# Patient Record
Sex: FEMALE | Race: WHITE | Employment: OTHER | URBAN - METROPOLITAN AREA
[De-identification: names, ages, dates, MRNs, and addresses within clinical notes are randomized per-mention and may not be internally consistent; named-entity substitution may affect disease eponyms.]

---

## 2023-07-23 ENCOUNTER — HOSPITAL ENCOUNTER (EMERGENCY)
Age: 68
Discharge: HOME | End: 2023-07-24
Attending: EMERGENCY MEDICINE
Payer: MEDICARE

## 2023-07-23 DIAGNOSIS — R03.0 ELEVATED BLOOD PRESSURE READING: Primary | ICD-10-CM

## 2023-07-23 LAB
BACTERIA URNS QL MICRO: NEGATIVE /HPF
BILIRUB UR QL STRIP: NEGATIVE
CLARITY UR: CLEAR
COLOR UR: YELLOW
EPI CELLS #/AREA URNS AUTO: ABNORMAL /HPF (ref 0–5)
GLUCOSE UR STRIP-MCNC: NEGATIVE MG/DL
HGB UR QL STRIP: ABNORMAL
HYALINE CASTS #/AREA URNS AUTO: ABNORMAL /HPF (ref 0–5)
KETONES UR STRIP-MCNC: NEGATIVE MG/DL
LEUKOCYTE ESTERASE UR QL STRIP: NEGATIVE
NITRITE UR QL STRIP: NEGATIVE
PH UR STRIP: 6.5 [PH] (ref 5–9)
PROT UR STRIP-MCNC: NEGATIVE MG/DL
RBC #/AREA URNS AUTO: ABNORMAL /HPF (ref 0–5)
SP GR UR STRIP: 1.01 (ref 1–1.03)
UROBILINOGEN UR STRIP-ACNC: 0.2 E.U./DL
WBC #/AREA URNS AUTO: ABNORMAL /HPF (ref 0–5)

## 2023-07-23 PROCEDURE — 84484 ASSAY OF TROPONIN QUANT: CPT

## 2023-07-23 PROCEDURE — 85025 COMPLETE CBC W/AUTO DIFF WBC: CPT

## 2023-07-23 PROCEDURE — 81001 URINALYSIS AUTO W/SCOPE: CPT

## 2023-07-23 PROCEDURE — 99284 EMERGENCY DEPT VISIT MOD MDM: CPT

## 2023-07-23 PROCEDURE — 36415 COLL VENOUS BLD VENIPUNCTURE: CPT

## 2023-07-23 PROCEDURE — 80053 COMPREHEN METABOLIC PANEL: CPT

## 2023-07-23 ASSESSMENT — PAIN - FUNCTIONAL ASSESSMENT: PAIN_FUNCTIONAL_ASSESSMENT: NONE - DENIES PAIN

## 2023-07-24 VITALS
WEIGHT: 140 LBS | OXYGEN SATURATION: 97 % | RESPIRATION RATE: 11 BRPM | SYSTOLIC BLOOD PRESSURE: 150 MMHG | BODY MASS INDEX: 23.9 KG/M2 | HEIGHT: 64 IN | HEART RATE: 65 BPM | DIASTOLIC BLOOD PRESSURE: 79 MMHG | TEMPERATURE: 97.9 F

## 2023-07-24 LAB
ALBUMIN SERPL-MCNC: 4.9 G/DL (ref 3.5–4.6)
ALP SERPL-CCNC: 71 U/L (ref 40–130)
ALT SERPL-CCNC: 19 U/L (ref 0–33)
ANION GAP SERPL CALCULATED.3IONS-SCNC: 10 MEQ/L (ref 9–15)
ANISOCYTOSIS BLD QL SMEAR: ABNORMAL
AST SERPL-CCNC: 18 U/L (ref 0–35)
BASOPHILS # BLD: 0.1 K/UL (ref 0–0.2)
BASOPHILS NFR BLD: 1 %
BILIRUB SERPL-MCNC: <0.2 MG/DL (ref 0.2–0.7)
BUN SERPL-MCNC: 23 MG/DL (ref 8–23)
CALCIUM SERPL-MCNC: 9.8 MG/DL (ref 8.5–9.9)
CHLORIDE SERPL-SCNC: 102 MEQ/L (ref 95–107)
CO2 SERPL-SCNC: 28 MEQ/L (ref 20–31)
CREAT SERPL-MCNC: 0.78 MG/DL (ref 0.5–0.9)
DACRYOCYTES BLD QL SMEAR: ABNORMAL
EOSINOPHIL # BLD: 0.1 K/UL (ref 0–0.7)
EOSINOPHIL NFR BLD: 1.4 %
ERYTHROCYTE [DISTWIDTH] IN BLOOD BY AUTOMATED COUNT: 16.7 % (ref 11.5–14.5)
GLOBULIN SER CALC-MCNC: 3 G/DL (ref 2.3–3.5)
GLUCOSE SERPL-MCNC: 117 MG/DL (ref 70–99)
HCT VFR BLD AUTO: 40 % (ref 37–47)
HGB BLD-MCNC: 12.6 G/DL (ref 12–16)
LYMPHOCYTES # BLD: 2.4 K/UL (ref 1–4.8)
LYMPHOCYTES NFR BLD: 28.1 %
MCH RBC QN AUTO: 19.8 PG (ref 27–31.3)
MCHC RBC AUTO-ENTMCNC: 31.4 % (ref 33–37)
MCV RBC AUTO: 63 FL (ref 79.4–94.8)
MICROCYTES BLD QL SMEAR: ABNORMAL
MONOCYTES # BLD: 0.6 K/UL (ref 0.2–0.8)
MONOCYTES NFR BLD: 7.4 %
NEUTROPHILS # BLD: 5.4 K/UL (ref 1.4–6.5)
NEUTS SEG NFR BLD: 62.1 %
OVALOCYTES BLD QL SMEAR: ABNORMAL
PLATELET # BLD AUTO: 369 K/UL (ref 130–400)
POLYCHROMASIA BLD QL SMEAR: ABNORMAL
POTASSIUM SERPL-SCNC: 4.1 MEQ/L (ref 3.4–4.9)
PROT SERPL-MCNC: 7.9 G/DL (ref 6.3–8)
RBC # BLD AUTO: 6.35 M/UL (ref 4.2–5.4)
SLIDE REVIEW: ABNORMAL
SODIUM SERPL-SCNC: 140 MEQ/L (ref 135–144)
TARGETS BLD QL SMEAR: ABNORMAL
TROPONIN T SERPL-MCNC: <0.01 NG/ML (ref 0–0.01)
WBC # BLD AUTO: 8.7 K/UL (ref 4.8–10.8)

## 2023-07-24 PROCEDURE — 6370000000 HC RX 637 (ALT 250 FOR IP): Performed by: EMERGENCY MEDICINE

## 2023-07-24 RX ORDER — AMLODIPINE BESYLATE 5 MG/1
5 TABLET ORAL ONCE
Status: COMPLETED | OUTPATIENT
Start: 2023-07-24 | End: 2023-07-24

## 2023-07-24 RX ORDER — AMLODIPINE BESYLATE 5 MG/1
5 TABLET ORAL DAILY
Qty: 30 TABLET | Refills: 0 | Status: SHIPPED | OUTPATIENT
Start: 2023-07-24 | End: 2023-08-23

## 2023-07-24 RX ADMIN — AMLODIPINE BESYLATE 5 MG: 5 TABLET ORAL at 00:27

## 2023-07-24 NOTE — ED PROVIDER NOTES
[facility] ED  EMERGENCY DEPARTMENT ENCOUNTER      Pt Name: Carlos Mesa  MRN: [id number]  [address] [facility] [place] 1955  Date of evaluation: 7/23/2023  Provider: Bozena Richard DO    CHIEF COMPLAINT       Chief Complaint   Patient presents with    Hypertension     X2 weeks. States she does not have a history of hypertension          HISTORY OF PRESENT ILLNESS   (Location/Symptom, Timing/Onset, Context/Setting, Quality, Duration, Modifying Factors, Severity)  Note limiting factors. Carlos Mesa is a 77 y.o. female who presents to the emergency department for elevated bp.     77year old female presents for evaluation of elevated blood pressure. Has been elevated the past few weeks, and she has been keeping a log. Felt funny tonight and her blood pressure was even higher. Denies any focal weakness, or chest pain. No hx htn. The history is provided by the patient. Nursing Notes were reviewed. REVIEW OF SYSTEMS    (2-9 systems for level 4, 10 or more for level 5)     Review of Systems   All other systems reviewed and are negative. Except as noted above the remainder of the review of systems was reviewed and negative. PAST MEDICAL HISTORY   History reviewed. No pertinent past medical history. SURGICAL HISTORY     History reviewed. No pertinent surgical history. CURRENT MEDICATIONS       Previous Medications    No medications on file       ALLERGIES     Patient has no allergy information on record. FAMILY HISTORY     History reviewed. No pertinent family history.        SOCIAL HISTORY       Social History     Socioeconomic History    Marital status:      Spouse name: None    Number of children: None    Years of education: None    Highest education level: None   Tobacco Use    Smoking status: Never    Smokeless tobacco: Never   Substance and Sexual Activity    Alcohol use: Not Currently    Drug use: Yes     Types: Marijuana Ara Rival)     Comment: very rare       SCREENINGS

## 2023-07-31 PROBLEM — I44.7 LEFT BUNDLE BRANCH BLOCK (LBBB): Status: ACTIVE | Noted: 2023-07-31

## 2023-07-31 PROBLEM — N95.1 MENOPAUSAL SYNDROME: Status: ACTIVE | Noted: 2023-07-31

## 2023-07-31 PROBLEM — H40.9 GLAUCOMA: Status: ACTIVE | Noted: 2023-07-31

## 2023-07-31 PROBLEM — R07.89 ATYPICAL CHEST PAIN: Status: ACTIVE | Noted: 2023-07-31

## 2023-07-31 PROBLEM — M51.369 DEGENERATION OF INTERVERTEBRAL DISC OF LUMBAR REGION: Status: ACTIVE | Noted: 2022-12-13

## 2023-07-31 PROBLEM — E78.5 DYSLIPIDEMIA: Status: ACTIVE | Noted: 2023-07-31

## 2023-07-31 PROBLEM — F41.8 ANXIETY ABOUT HEALTH: Status: ACTIVE | Noted: 2023-07-31

## 2023-07-31 PROBLEM — M51.36 DEGENERATION OF INTERVERTEBRAL DISC OF LUMBAR REGION: Status: ACTIVE | Noted: 2022-12-13

## 2023-07-31 PROBLEM — M54.9 BACK PAIN: Status: ACTIVE | Noted: 2023-02-07

## 2023-07-31 PROBLEM — M79.7 FIBROMYOSITIS: Status: ACTIVE | Noted: 2023-07-31

## 2023-07-31 PROBLEM — R25.2 CRAMPS OF LOWER EXTREMITY: Status: ACTIVE | Noted: 2023-07-31

## 2023-07-31 PROBLEM — E03.9 HYPOTHYROIDISM: Status: ACTIVE | Noted: 2023-07-31

## 2023-07-31 PROBLEM — D56.9 THALASSEMIA: Status: ACTIVE | Noted: 2023-07-31

## 2023-08-29 ENCOUNTER — OFFICE VISIT (OUTPATIENT)
Dept: PRIMARY CARE | Facility: CLINIC | Age: 68
End: 2023-08-29
Payer: MEDICARE

## 2023-08-29 ENCOUNTER — LAB (OUTPATIENT)
Dept: LAB | Facility: LAB | Age: 68
End: 2023-08-29
Payer: MEDICARE

## 2023-08-29 VITALS
SYSTOLIC BLOOD PRESSURE: 148 MMHG | OXYGEN SATURATION: 98 % | TEMPERATURE: 97.6 F | DIASTOLIC BLOOD PRESSURE: 84 MMHG | HEIGHT: 63 IN | WEIGHT: 142.2 LBS | RESPIRATION RATE: 16 BRPM | BODY MASS INDEX: 25.2 KG/M2 | HEART RATE: 59 BPM

## 2023-08-29 DIAGNOSIS — Z00.00 HEALTH CARE MAINTENANCE: ICD-10-CM

## 2023-08-29 DIAGNOSIS — R25.2 CRAMPS OF LOWER EXTREMITY: ICD-10-CM

## 2023-08-29 DIAGNOSIS — D64.9 ANEMIA, UNSPECIFIED TYPE: Primary | ICD-10-CM

## 2023-08-29 DIAGNOSIS — I10 HYPERTENSION, UNSPECIFIED TYPE: Primary | ICD-10-CM

## 2023-08-29 DIAGNOSIS — E61.1 IRON DEFICIENCY: ICD-10-CM

## 2023-08-29 PROBLEM — M79.7 FIBROMYALGIA: Status: ACTIVE | Noted: 2023-08-29

## 2023-08-29 PROBLEM — M54.42 ACUTE LEFT-SIDED LOW BACK PAIN WITH LEFT-SIDED SCIATICA: Status: ACTIVE | Noted: 2023-08-29

## 2023-08-29 PROBLEM — E04.1 CYST OF THYROID: Status: ACTIVE | Noted: 2023-08-29

## 2023-08-29 PROBLEM — G47.62 NOCTURNAL LEG CRAMPS: Status: ACTIVE | Noted: 2023-08-29

## 2023-08-29 PROBLEM — R07.9 CHEST PAIN: Status: ACTIVE | Noted: 2023-08-29

## 2023-08-29 PROBLEM — I44.7 LBBB (LEFT BUNDLE BRANCH BLOCK): Status: ACTIVE | Noted: 2023-08-29

## 2023-08-29 PROBLEM — N95.1 FEMALE CLIMACTERIC: Status: ACTIVE | Noted: 2023-08-29

## 2023-08-29 PROBLEM — E55.9 VITAMIN D DEFICIENCY: Status: ACTIVE | Noted: 2023-08-29

## 2023-08-29 PROBLEM — R00.2 PALPITATIONS: Status: ACTIVE | Noted: 2023-08-29

## 2023-08-29 LAB
ERYTHROCYTE DISTRIBUTION WIDTH (RATIO) BY AUTOMATED COUNT: 17.9 % (ref 11.5–14.5)
ERYTHROCYTE MEAN CORPUSCULAR HEMOGLOBIN CONCENTRATION (G/DL) BY AUTOMATED: 30.1 G/DL (ref 32–36)
ERYTHROCYTE MEAN CORPUSCULAR VOLUME (FL) BY AUTOMATED COUNT: 65 FL (ref 80–100)
ERYTHROCYTES (10*6/UL) IN BLOOD BY AUTOMATED COUNT: 5.68 X10E12/L (ref 4–5.2)
HEMATOCRIT (%) IN BLOOD BY AUTOMATED COUNT: 37.2 % (ref 36–46)
HEMOGLOBIN (G/DL) IN BLOOD: 11.2 G/DL (ref 12–16)
LEUKOCYTES (10*3/UL) IN BLOOD BY AUTOMATED COUNT: 5.9 X10E9/L (ref 4.4–11.3)
PLATELETS (10*3/UL) IN BLOOD AUTOMATED COUNT: 343 X10E9/L (ref 150–450)

## 2023-08-29 PROCEDURE — 3077F SYST BP >= 140 MM HG: CPT | Performed by: INTERNAL MEDICINE

## 2023-08-29 PROCEDURE — 85027 COMPLETE CBC AUTOMATED: CPT

## 2023-08-29 PROCEDURE — 1159F MED LIST DOCD IN RCRD: CPT | Performed by: INTERNAL MEDICINE

## 2023-08-29 PROCEDURE — 90471 IMMUNIZATION ADMIN: CPT | Performed by: INTERNAL MEDICINE

## 2023-08-29 PROCEDURE — 90715 TDAP VACCINE 7 YRS/> IM: CPT | Performed by: INTERNAL MEDICINE

## 2023-08-29 PROCEDURE — 36415 COLL VENOUS BLD VENIPUNCTURE: CPT

## 2023-08-29 PROCEDURE — 1036F TOBACCO NON-USER: CPT | Performed by: INTERNAL MEDICINE

## 2023-08-29 PROCEDURE — 82728 ASSAY OF FERRITIN: CPT

## 2023-08-29 PROCEDURE — 3079F DIAST BP 80-89 MM HG: CPT | Performed by: INTERNAL MEDICINE

## 2023-08-29 PROCEDURE — 99204 OFFICE O/P NEW MOD 45 MIN: CPT | Performed by: INTERNAL MEDICINE

## 2023-08-29 RX ORDER — ROSUVASTATIN CALCIUM 10 MG/1
10 TABLET, COATED ORAL DAILY
COMMUNITY
End: 2023-12-08 | Stop reason: SDUPTHER

## 2023-08-29 RX ORDER — AMLODIPINE BESYLATE 5 MG/1
5 TABLET ORAL DAILY
Qty: 90 TABLET | Refills: 3 | Status: SHIPPED | OUTPATIENT
Start: 2023-08-29 | End: 2024-04-10

## 2023-08-29 RX ORDER — TIMOLOL MALEATE 5 MG/ML
SOLUTION/ DROPS OPHTHALMIC
COMMUNITY
Start: 2023-05-16

## 2023-08-29 RX ORDER — AMLODIPINE BESYLATE 5 MG/1
5 TABLET ORAL DAILY
COMMUNITY
End: 2023-08-29 | Stop reason: SDUPTHER

## 2023-08-29 RX ORDER — LEVOTHYROXINE SODIUM 50 UG/1
50 TABLET ORAL
COMMUNITY
Start: 2019-09-06 | End: 2023-12-18 | Stop reason: SDUPTHER

## 2023-08-29 ASSESSMENT — PATIENT HEALTH QUESTIONNAIRE - PHQ9
1. LITTLE INTEREST OR PLEASURE IN DOING THINGS: NOT AT ALL
SUM OF ALL RESPONSES TO PHQ9 QUESTIONS 1 AND 2: 0
2. FEELING DOWN, DEPRESSED OR HOPELESS: NOT AT ALL

## 2023-08-29 NOTE — PROGRESS NOTES
"Subjective   Patient ID: [id number]     Jordana Haney is a 68 y.o. female who presents for New Patient Visit.    Current Outpatient Medications:     levothyroxine (Synthroid, Levoxyl) 50 mcg tablet, Take 1 tablet (50 mcg) by mouth once daily in the morning. Take before meals., Disp: , Rfl:     multivit-min/ferrous fumarate (MULTI VITAMIN ORAL), On occasion, Disp: , Rfl:     rosuvastatin (Crestor) 10 mg tablet, Take 1 tablet (10 mg) by mouth once daily., Disp: , Rfl:     timolol (Timoptic) 0.5 % ophthalmic solution, INSTILL ONE DROP INTO BOTH EYES IN THE MORNING, Disp: , Rfl:     amLODIPine (Norvasc) 5 mg tablet, Take 1 tablet (5 mg) by mouth once daily., Disp: 90 tablet, Rfl: 3  HPI  Jordana is a 68-year-old female patient presenting to the office today to establish care.    Patient is known to have history of hypertension, she was recently started on amlodipine 5 mg p.o. daily.  She did bring his home measurements today the last week has been all below 140/90 and acceptable.  She denies any chest pain or difficulty breathing.  She does not have occasional mild low back pain with right worse than left without radiculopathy.  She is known to have chronic low back pain issues.    She did mention lower extremity cramping.  She just had blood work done in July that indicated normal electrolytes.  There was evidence of microcytic hypochromic changes suggestive of iron deficiency.  We are going to follow-up on that today.  Review of system was reviewed all normal except what is noted in HPI   Past Medical History:   Diagnosis Date    Elevated cholesterol     Glaucoma     Hypertension     Hypothyroidism       Objective   /84   Pulse 59   Temp 36.4 °C (97.6 °F)   Resp 16   Ht 1.588 m (5' 2.5\")   Wt 64.5 kg (142 lb 3.2 oz)   SpO2 98%   BMI 25.59 kg/m²      Physical Exam  Constitutional:       Appearance: Normal appearance.   Cardiovascular:      Rate and Rhythm: Normal rate and regular rhythm.      Pulses: Normal " pulses.      Heart sounds: Normal heart sounds.   Pulmonary:      Effort: Pulmonary effort is normal.      Breath sounds: Normal breath sounds.   Neurological:      Mental Status: She is alert.       Assessment/Plan   Problem List Items Addressed This Visit    None  Visit Diagnoses       Hypertension, unspecified type    -  Primary    Relevant Medications    amLODIPine (Norvasc) 5 mg tablet    Health care maintenance        Relevant Orders    Tdap vaccine, age 10 years and older  (BOOSTRIX)          68-year-old patient with the following issues.    1.  Hypertension, her blood pressure today was elevated in the office yet her home readings are very well controlled.  We are going to continue monitoring and we will do dose adjustment if necessary  She was instructed to bring her blood pressure machine with her to compare and confirm accuracy.    2.  Low back pain right worse than left likely musculoskeletal in nature without radiculopathy patient was instructed to apply heating pad to the area and to use Voltaren gel on as-needed basis and if it gets worse we will definitely proceed with further evaluation.  She did not want any x-rays done today.    3.  Leg cramps likely related to iron deficiency her CBC indicates hypochromic microcytic changes repeat CBC with ferritin level will be obtained and patient will be started on a supplement if the patient is truly iron deficient we are going to refer her to GI for further evaluation with upper and lower endoscopy.    No other active issues or concerns during this visit I will see the patient back in the office for her annual exam within the next 3 months.  Patricia Crawley MD

## 2023-08-30 LAB — FERRITIN (UG/LL) IN SER/PLAS: 280 UG/L (ref 8–150)

## 2023-09-06 ENCOUNTER — TELEPHONE (OUTPATIENT)
Dept: PRIMARY CARE | Facility: CLINIC | Age: 68
End: 2023-09-06
Payer: MEDICARE

## 2023-11-09 ENCOUNTER — TELEPHONE (OUTPATIENT)
Dept: PRIMARY CARE | Facility: CLINIC | Age: 68
End: 2023-11-09

## 2023-11-09 NOTE — TELEPHONE ENCOUNTER
Contacted pt to see if she would like to set up her annual exam sooner into this year and she declined since she wanted to keep it close to when she is due for her mammogram in February. She has her MCW set currently for 2/27/24. Also stated she has had her colonoscopy done back in 2019 and is not due for it until 2024. She believes it was done with [facility] with Dr Rapp. Pt is aware a release of records will need signed if this is correct. Please advise if we are able to verify if colonoscopy was done with them. Phone # is [phone]. Thank you!

## 2023-12-08 DIAGNOSIS — E78.5 HYPERLIPIDEMIA, UNSPECIFIED HYPERLIPIDEMIA TYPE: Primary | ICD-10-CM

## 2023-12-08 RX ORDER — ROSUVASTATIN CALCIUM 10 MG/1
10 TABLET, COATED ORAL DAILY
Qty: 90 TABLET | Refills: 3 | Status: SHIPPED | OUTPATIENT
Start: 2023-12-08

## 2023-12-18 DIAGNOSIS — E03.9 ACQUIRED HYPOTHYROIDISM: ICD-10-CM

## 2023-12-18 RX ORDER — LEVOTHYROXINE SODIUM 50 UG/1
50 TABLET ORAL
Qty: 90 TABLET | Refills: 3 | Status: SHIPPED | OUTPATIENT
Start: 2023-12-18

## 2023-12-18 NOTE — TELEPHONE ENCOUNTER
----- Message from Jordana Haney sent at 12/16/2023 10:54 AM EST -----  Regarding: Refill  Contact: [phone]  Please send a 90 day refill request to [facility] for Levothyroxine with 4 or 5 refills.  Thank you.

## 2024-02-27 ENCOUNTER — OFFICE VISIT (OUTPATIENT)
Dept: PRIMARY CARE | Facility: CLINIC | Age: 69
End: 2024-02-27
Payer: MEDICARE

## 2024-02-27 ENCOUNTER — HOSPITAL ENCOUNTER (OUTPATIENT)
Dept: RADIOLOGY | Facility: CLINIC | Age: 69
Discharge: HOME | End: 2024-02-27
Payer: MEDICARE

## 2024-02-27 VITALS
SYSTOLIC BLOOD PRESSURE: 144 MMHG | HEIGHT: 63 IN | OXYGEN SATURATION: 99 % | HEART RATE: 81 BPM | DIASTOLIC BLOOD PRESSURE: 78 MMHG | WEIGHT: 145.8 LBS | BODY MASS INDEX: 25.83 KG/M2

## 2024-02-27 DIAGNOSIS — G47.00 INSOMNIA, UNSPECIFIED TYPE: ICD-10-CM

## 2024-02-27 DIAGNOSIS — K21.9 GASTROESOPHAGEAL REFLUX DISEASE WITHOUT ESOPHAGITIS: ICD-10-CM

## 2024-02-27 DIAGNOSIS — Z12.31 ENCOUNTER FOR SCREENING MAMMOGRAM FOR MALIGNANT NEOPLASM OF BREAST: ICD-10-CM

## 2024-02-27 DIAGNOSIS — M79.671 RIGHT FOOT PAIN: ICD-10-CM

## 2024-02-27 DIAGNOSIS — Z00.00 HEALTH CARE MAINTENANCE: Primary | ICD-10-CM

## 2024-02-27 DIAGNOSIS — M79.661 RIGHT CALF PAIN: ICD-10-CM

## 2024-02-27 DIAGNOSIS — Z12.11 SCREENING FOR COLON CANCER: ICD-10-CM

## 2024-02-27 PROCEDURE — 1159F MED LIST DOCD IN RCRD: CPT | Performed by: INTERNAL MEDICINE

## 2024-02-27 PROCEDURE — G0439 PPPS, SUBSEQ VISIT: HCPCS | Performed by: INTERNAL MEDICINE

## 2024-02-27 PROCEDURE — 1170F FXNL STATUS ASSESSED: CPT | Performed by: INTERNAL MEDICINE

## 2024-02-27 PROCEDURE — 93000 ELECTROCARDIOGRAM COMPLETE: CPT | Performed by: INTERNAL MEDICINE

## 2024-02-27 PROCEDURE — 73620 X-RAY EXAM OF FOOT: CPT | Mod: RT

## 2024-02-27 PROCEDURE — 73620 X-RAY EXAM OF FOOT: CPT | Mod: RIGHT SIDE | Performed by: RADIOLOGY

## 2024-02-27 PROCEDURE — 99497 ADVNCD CARE PLAN 30 MIN: CPT | Performed by: INTERNAL MEDICINE

## 2024-02-27 PROCEDURE — 1158F ADVNC CARE PLAN TLK DOCD: CPT | Performed by: INTERNAL MEDICINE

## 2024-02-27 PROCEDURE — 1036F TOBACCO NON-USER: CPT | Performed by: INTERNAL MEDICINE

## 2024-02-27 PROCEDURE — G0444 DEPRESSION SCREEN ANNUAL: HCPCS | Performed by: INTERNAL MEDICINE

## 2024-02-27 PROCEDURE — 1123F ACP DISCUSS/DSCN MKR DOCD: CPT | Performed by: INTERNAL MEDICINE

## 2024-02-27 PROCEDURE — 99397 PER PM REEVAL EST PAT 65+ YR: CPT | Performed by: INTERNAL MEDICINE

## 2024-02-27 RX ORDER — OMEPRAZOLE 20 MG/1
20 TABLET, DELAYED RELEASE ORAL DAILY
Qty: 30 TABLET | Refills: 11 | COMMUNITY
Start: 2024-02-27 | End: 2024-05-02 | Stop reason: ALTCHOICE

## 2024-02-27 RX ORDER — TRAZODONE HYDROCHLORIDE 50 MG/1
50 TABLET ORAL NIGHTLY
Qty: 30 TABLET | Refills: 3 | Status: SHIPPED | OUTPATIENT
Start: 2024-02-27 | End: 2025-02-26

## 2024-02-27 ASSESSMENT — ACTIVITIES OF DAILY LIVING (ADL)
DOING_HOUSEWORK: INDEPENDENT
MANAGING_FINANCES: INDEPENDENT
TAKING_MEDICATION: INDEPENDENT
GROCERY_SHOPPING: INDEPENDENT
BATHING: INDEPENDENT
DRESSING: INDEPENDENT

## 2024-02-27 ASSESSMENT — PATIENT HEALTH QUESTIONNAIRE - PHQ9
SUM OF ALL RESPONSES TO PHQ9 QUESTIONS 1 AND 2: 0
2. FEELING DOWN, DEPRESSED OR HOPELESS: NOT AT ALL
1. LITTLE INTEREST OR PLEASURE IN DOING THINGS: NOT AT ALL

## 2024-02-27 NOTE — PROGRESS NOTES
Medicare Wellness Exam/Comprehensive Problem Focused Follow Up and Physical Exam  Chief Complaint   Patient presents with    Medicare Annual Wellness Visit Subsequent    Annual Exam     HPI:  68-year-old patient presented to the office today for her Medicare wellness visit as well as her annual examination.    Patient is doing great she denies any chest pain or difficulty breathing on exertion.  She occasionally when she experiences heartburn she feels some twinges in her chest but no exertional symptoms.  She denies any abdominal pain nausea vomiting changes in the bowel movements or blood in the stool she denies urine symptoms.  Her appetite is good her energy level is adequate.    She does have trouble sleeping and she was in agreement to try trazodone.    She also did notice some swelling in her lower extremities and pain in her right foot mostly on the lateral outer aspect of the right foot no recent trauma no recent fall no calf pain no previous history of blood clots no family history of thromboembolic disease.  Assessment and Plan:    68-year-old patient with the following issues.    1.Essential benign hypertension, the patient's blood pressure today remains elevated and I believe her home readings are still elevated we are going to increase her amlodipine to 7.5 mg p.o. daily instead of 5 mg p.o. daily and I will see her back within the next 7 to 10 days for blood pressure follow-up with her machine to compare and confirm accuracy patient stated understanding.    2.  Hyperlipidemia on rosuvastatin the plan is to continue lipid profile is requested we will adjust the treatment if necessary.    3.  Hypothyroidism on levothyroxine TSH is pending dose adjustment will take place if necessary.    4.  Concerns regarding insomnia we are going to do a trial of trazodone at bedtime and we will assess the clinical response.    5.  Symptoms suggestive of acid reflux, a trial of proton pump inhibitor was indicated the  "prescription was sent to the pharmacy.    6.  Left bundle branch block noted on EKG which was compared to prior studies and it is definitely been noted on a prior EKG in 2018 at that point patient had a stress test which was negative.    7.  Healthcare maintenance issues lab work is requested mammogram colonoscopy and DEXA scan requested.    8.  Medicare wellness study was completed with all its requirements.    Disposition I will see the patient back in the office in 1 year for repeat physical in 3 months for follow-up.    ROS otherwise negative aside from what was mentioned above in HPI.    Vitals  /78 (BP Location: Left arm, Patient Position: Sitting)   Pulse 81   Ht 1.588 m (5' 2.5\")   Wt 66.1 kg (145 lb 12.8 oz)   SpO2 99%   BMI 26.24 kg/m²   Body mass index is 26.24 kg/m².  Physical Exam  Gen: Alert, NAD  HEENT:  Unremarkable  Neck:  No DAMON  Respiratory:  Lungs CTAB  Cardiovascular:  Heart RRR  Neuro:  Gross motor and sensory intact  Skin:  No suspicious lesions present  Breast: No masses, or axillary lymphadenopathy      Patient Care Team:  Patricia Crawley MD as PCP - General (Internal Medicine)  Patricia Crawley MD as PCP - [employer] PCP  Nighat Barbour MD as Referring Physician (Dermatology)       Activities of Daily Living  In your present state of health, do you have any difficulty performing the following activities?:   Preparing food and eating?: No  Bathing yourself: No  Getting dressed: No  Using the toilet:No  Moving around from place to place: No  In the past year have you fallen or had a near fall?:No  Able to manage finances independently: Yes  Able to perform grocery shopping: Yes  Able to manage medications independently: Yes  Able to do housework independently: Yes  Patient self-assessment of health status? Good    Current exercise habits:  work out 4-5 times per wk   Dietary issues discussed: Yes  Hearing difficulties: No  Safe in current home environment: " Yes  Visual Acuity assessed: Yes  Cognitive Impairment No  Allergies and Medications  Povidone-iodine  Current Outpatient Medications   Medication Instructions    amLODIPine (NORVASC) 5 mg, oral, Daily    levothyroxine (SYNTHROID, LEVOXYL) 50 mcg, oral, Daily before breakfast    multivit-min/ferrous fumarate (MULTI VITAMIN ORAL) On occasion    rosuvastatin (CRESTOR) 10 mg, oral, Daily    timolol (Timoptic) 0.5 % ophthalmic solution  INSTILL ONE DROP INTO BOTH EYES IN THE MORNING<BR>       Medications and Supplements  prescribed by me and other practitioners or clinical pharmacist (such as prescriptions, OTC's, herbal therapies and supplements) were reviewed and documented in the medical record.      Active Problem List  Patient Active Problem List   Diagnosis    Anxiety about health    Atypical chest pain    Back pain    Cramps of lower extremity    Degeneration of intervertebral disc of lumbar region    Dyslipidemia    Fibromyositis    Glaucoma    Hypothyroidism    Left bundle branch block (LBBB)    Menopausal syndrome    Thalassemia    Fibromyalgia    Palpitations    Vitamin D deficiency    Acute left-sided low back pain with left-sided sciatica    Chest pain    Nocturnal leg cramps    Cyst of thyroid    LBBB (left bundle branch block)    Female climacteric       Comprehensive Medical/Surgical/Social/Family History  Past Medical History:   Diagnosis Date    Elevated cholesterol     Glaucoma     Hypertension     Hypothyroidism      Past Surgical History:   Procedure Laterality Date    BREAST BIOPSY Right      SECTION, CLASSIC      THYROID SURGERY      nodule removed     Social History     Social History Narrative    Not on file     Tobacco/Alcohol/Opioid use, as well as Illicit Drug Use was screened for/reviewed and documented in Social Documentation section of the chart and medication list as appropriate    Depression Screening  Depression screening (15m) completed using the PHQ-2 questions with  results documented in the chart/encounter  (Rooming Screening section for documentation, or note for additional information)    Cardiac Risk Assessment  Cardiovascular risk was discussed and, if needed, lifestyle modifications recommended, including nutritional choices, exercise, and elimination of habits contributing to risk.   We agreed on a plan to reduce the current cardiovascular risk based on above discussion as needed.     Aspirin use/disuse was discussed and documented in the Problem List of the medical record (under Cardiac Risk Counseling) after reviewing the updated guidelines below:  Consider low dose Aspirin ( mg) use if the benefit for cardiovascular disease prevention outweighs risk for bleeding complications.   In general, low dose ASA should be considered:  In patients WITHOUT prior MI/stroke/PAD (primary prevention):   a. Age <60: Use if 10-year cardiovascular disease risk >20%, with discussion of risks and benefits with patient  b. Age 60-<70: Use if 10-year cardiovascular disease risk >20% and low bleeding (e.g., gastrointestinal) risk, with discussion of risks and benefits with patient  c. Age >=70: Do not use    In patients WITH prior MI/stroke/PAD (secondary prevention):   Generally use unless extremely high bleeding (e.g., gastrointenstinal) risk, with discussion of risks and benefits with patient    Advance Directives Discussion  Advanced Care Planning (including a Living Will, Healthcare POA, as well as specific end of life choices and/or directives), was discussed with the patient and/or surrogate, voluntarily, and details of that discussion documented in the Problem List (under Advanced Directives Discussion) of the medical record.    (~16 min spent discussing above)     During the course of the visit the patient was educated and counseled about age appropriate screening and preventive services. Completed preventive screenings were documented in the chart and orders were placed for  outstanding screenings/procedures as documented in the Assessment and Plan.    Patient Instructions (the written plan) was given to the patient at check out.

## 2024-02-29 ENCOUNTER — LAB (OUTPATIENT)
Dept: LAB | Facility: LAB | Age: 69
End: 2024-02-29
Payer: MEDICARE

## 2024-02-29 DIAGNOSIS — Z00.00 HEALTH CARE MAINTENANCE: ICD-10-CM

## 2024-02-29 DIAGNOSIS — K21.9 GASTROESOPHAGEAL REFLUX DISEASE WITHOUT ESOPHAGITIS: ICD-10-CM

## 2024-02-29 DIAGNOSIS — M79.661 RIGHT CALF PAIN: ICD-10-CM

## 2024-02-29 LAB
ALBUMIN SERPL BCP-MCNC: 4.6 G/DL (ref 3.4–5)
ALP SERPL-CCNC: 62 U/L (ref 33–136)
ALT SERPL W P-5'-P-CCNC: 17 U/L (ref 7–45)
ANION GAP SERPL CALC-SCNC: 12 MMOL/L (ref 10–20)
AST SERPL W P-5'-P-CCNC: 18 U/L (ref 9–39)
BILIRUB SERPL-MCNC: 0.5 MG/DL (ref 0–1.2)
BUN SERPL-MCNC: 16 MG/DL (ref 6–23)
CALCIUM SERPL-MCNC: 9.6 MG/DL (ref 8.6–10.3)
CHLORIDE SERPL-SCNC: 105 MMOL/L (ref 98–107)
CHOLEST SERPL-MCNC: 169 MG/DL (ref 0–199)
CHOLESTEROL/HDL RATIO: 2.5
CO2 SERPL-SCNC: 28 MMOL/L (ref 21–32)
CREAT SERPL-MCNC: 0.79 MG/DL (ref 0.5–1.05)
D DIMER PPP FEU-MCNC: 302 NG/ML FEU
EGFRCR SERPLBLD CKD-EPI 2021: 81 ML/MIN/1.73M*2
ERYTHROCYTE [DISTWIDTH] IN BLOOD BY AUTOMATED COUNT: 18.2 % (ref 11.5–14.5)
GLUCOSE SERPL-MCNC: 88 MG/DL (ref 74–99)
HCT VFR BLD AUTO: 39.6 % (ref 36–46)
HDLC SERPL-MCNC: 68 MG/DL
HGB BLD-MCNC: 12.1 G/DL (ref 12–16)
LDLC SERPL CALC-MCNC: 88 MG/DL
MCH RBC QN AUTO: 19.7 PG (ref 26–34)
MCHC RBC AUTO-ENTMCNC: 30.6 G/DL (ref 32–36)
MCV RBC AUTO: 65 FL (ref 80–100)
NON HDL CHOLESTEROL: 101 MG/DL (ref 0–149)
NRBC BLD-RTO: 0 /100 WBCS (ref 0–0)
PLATELET # BLD AUTO: 343 X10*3/UL (ref 150–450)
POTASSIUM SERPL-SCNC: 4.4 MMOL/L (ref 3.5–5.3)
PROT SERPL-MCNC: 6.8 G/DL (ref 6.4–8.2)
RBC # BLD AUTO: 6.13 X10*6/UL (ref 4–5.2)
SODIUM SERPL-SCNC: 141 MMOL/L (ref 136–145)
TRIGL SERPL-MCNC: 67 MG/DL (ref 0–149)
TSH SERPL-ACNC: 2.67 MIU/L (ref 0.44–3.98)
VLDL: 13 MG/DL (ref 0–40)
WBC # BLD AUTO: 7.2 X10*3/UL (ref 4.4–11.3)

## 2024-02-29 PROCEDURE — 84443 ASSAY THYROID STIM HORMONE: CPT

## 2024-02-29 PROCEDURE — 85027 COMPLETE CBC AUTOMATED: CPT

## 2024-02-29 PROCEDURE — 36415 COLL VENOUS BLD VENIPUNCTURE: CPT

## 2024-02-29 PROCEDURE — 80053 COMPREHEN METABOLIC PANEL: CPT

## 2024-02-29 PROCEDURE — 80061 LIPID PANEL: CPT

## 2024-02-29 PROCEDURE — 85379 FIBRIN DEGRADATION QUANT: CPT

## 2024-03-06 DIAGNOSIS — Z12.11 SCREENING FOR COLON CANCER: Primary | ICD-10-CM

## 2024-03-08 RX ORDER — POLYETHYLENE GLYCOL 3350, SODIUM SULFATE ANHYDROUS, SODIUM BICARBONATE, SODIUM CHLORIDE, POTASSIUM CHLORIDE 236; 22.74; 6.74; 5.86; 2.97 G/4L; G/4L; G/4L; G/4L; G/4L
4000 POWDER, FOR SOLUTION ORAL ONCE
Qty: 4000 ML | Refills: 0 | Status: SHIPPED | OUTPATIENT
Start: 2024-03-08 | End: 2024-03-08

## 2024-03-27 ENCOUNTER — HOSPITAL ENCOUNTER (OUTPATIENT)
Dept: RADIOLOGY | Facility: CLINIC | Age: 69
Discharge: HOME | End: 2024-03-27
Payer: MEDICARE

## 2024-03-27 VITALS — WEIGHT: 143 LBS | BODY MASS INDEX: 25.34 KG/M2 | HEIGHT: 63 IN

## 2024-03-27 DIAGNOSIS — Z12.31 ENCOUNTER FOR SCREENING MAMMOGRAM FOR MALIGNANT NEOPLASM OF BREAST: ICD-10-CM

## 2024-03-27 PROCEDURE — 77063 BREAST TOMOSYNTHESIS BI: CPT | Performed by: RADIOLOGY

## 2024-03-27 PROCEDURE — 77067 SCR MAMMO BI INCL CAD: CPT

## 2024-03-27 PROCEDURE — 77067 SCR MAMMO BI INCL CAD: CPT | Performed by: RADIOLOGY

## 2024-04-10 ENCOUNTER — OFFICE VISIT (OUTPATIENT)
Dept: PRIMARY CARE | Facility: CLINIC | Age: 69
End: 2024-04-10
Payer: MEDICARE

## 2024-04-10 VITALS
SYSTOLIC BLOOD PRESSURE: 150 MMHG | OXYGEN SATURATION: 96 % | DIASTOLIC BLOOD PRESSURE: 90 MMHG | HEIGHT: 63 IN | WEIGHT: 146.8 LBS | BODY MASS INDEX: 26.01 KG/M2 | HEART RATE: 58 BPM

## 2024-04-10 DIAGNOSIS — I10 HYPERTENSION, UNSPECIFIED TYPE: ICD-10-CM

## 2024-04-10 DIAGNOSIS — I44.7 LEFT BUNDLE BRANCH BLOCK (LBBB): Primary | Chronic | ICD-10-CM

## 2024-04-10 DIAGNOSIS — R00.2 PALPITATIONS: ICD-10-CM

## 2024-04-10 PROCEDURE — 99214 OFFICE O/P EST MOD 30 MIN: CPT | Performed by: NURSE PRACTITIONER

## 2024-04-10 PROCEDURE — 1123F ACP DISCUSS/DSCN MKR DOCD: CPT | Performed by: NURSE PRACTITIONER

## 2024-04-10 PROCEDURE — 3080F DIAST BP >= 90 MM HG: CPT | Performed by: NURSE PRACTITIONER

## 2024-04-10 PROCEDURE — 93000 ELECTROCARDIOGRAM COMPLETE: CPT | Performed by: NURSE PRACTITIONER

## 2024-04-10 PROCEDURE — 3077F SYST BP >= 140 MM HG: CPT | Performed by: NURSE PRACTITIONER

## 2024-04-10 PROCEDURE — 1159F MED LIST DOCD IN RCRD: CPT | Performed by: NURSE PRACTITIONER

## 2024-04-10 RX ORDER — LISINOPRIL 10 MG/1
10 TABLET ORAL DAILY
Qty: 90 TABLET | Refills: 0 | Status: SHIPPED | OUTPATIENT
Start: 2024-04-10 | End: 2024-07-09

## 2024-04-10 RX ORDER — AMLODIPINE BESYLATE 5 MG/1
5 TABLET ORAL DAILY
Qty: 90 TABLET | Refills: 0 | Status: SHIPPED | OUTPATIENT
Start: 2024-04-10

## 2024-04-10 ASSESSMENT — ENCOUNTER SYMPTOMS: HYPERTENSION: 1

## 2024-04-10 NOTE — PROGRESS NOTES
"Subjective   Patient ID: Jordana Haney \"Alexsander" is a 68 y.o. female who presents for Hypertension (Patient was increased to 1 1/2 tablets of amlodipine and has been taking it for 3 weeks. Patient has been experiencing dizziness, swollen feet and chest tightness. //Patient has been checking her blood pressure at home. Patient has been running in normal range. 121/69, 118/68 and 114/75. ).    Since starting higher dose of amlodipine she feels cheast heaviness and tightness and legs are swollen. No exertional SOB or chest pain and she exercises 4-5 times per week on the elliptical. She does have some dizziness. No leg swelling today. It is intermittent. Went up to higher dose for elevated BP readings at home on most days. Over the past 3 weeks Bps have been 110s-120s SBP.    Hypertension       Review of Systems  otherwise negative aside from what was mentioned above in HPI.    Objective   /90 (BP Location: Left arm, Patient Position: Sitting)   Pulse 58   Ht 1.6 m (5' 3\")   Wt 66.6 kg (146 lb 12.8 oz)   SpO2 96%   BMI 26.00 kg/m²     Physical Exam  Constitutional:       Appearance: Normal appearance.   Cardiovascular:      Rate and Rhythm: Normal rate and regular rhythm.   Pulmonary:      Effort: Pulmonary effort is normal.      Breath sounds: Normal breath sounds.   Abdominal:      General: Abdomen is flat.   Neurological:      General: No focal deficit present.      Mental Status: She is alert and oriented to person, place, and time. Mental status is at baseline.   Psychiatric:         Mood and Affect: Mood normal.         Behavior: Behavior normal.         Thought Content: Thought content normal.         Judgment: Judgment normal.         Assessment/Plan   Problem List Items Addressed This Visit             ICD-10-CM    Left bundle branch block (LBBB) - Primary (Chronic) I44.7     Unchanged EKG         Palpitations R00.2    Relevant Orders    ECG 12 lead (Clinic Performed)    Hypertension (Chronic) I10     " Cut back amlodipine to 5 mg.  Check BP daily on just this. If consistently >140/90 on most days add in the lisinopril 10 mg tab  EKG today unchanged         Relevant Medications    amLODIPine (Norvasc) 5 mg tablet    lisinopril 10 mg tablet

## 2024-04-10 NOTE — ASSESSMENT & PLAN NOTE
Cut back amlodipine to 5 mg.  Check BP daily on just this. If consistently >140/90 on most days add in the lisinopril 10 mg tab  EKG today unchanged

## 2024-04-17 ENCOUNTER — ANESTHESIA EVENT (OUTPATIENT)
Dept: GASTROENTEROLOGY | Facility: HOSPITAL | Age: 69
End: 2024-04-17
Payer: MEDICARE

## 2024-04-17 ENCOUNTER — ANESTHESIA (OUTPATIENT)
Dept: GASTROENTEROLOGY | Facility: HOSPITAL | Age: 69
End: 2024-04-17
Payer: MEDICARE

## 2024-04-17 ENCOUNTER — HOSPITAL ENCOUNTER (OUTPATIENT)
Dept: GASTROENTEROLOGY | Facility: HOSPITAL | Age: 69
Setting detail: OUTPATIENT SURGERY
Discharge: HOME | End: 2024-04-17
Payer: MEDICARE

## 2024-04-17 VITALS
BODY MASS INDEX: 25.34 KG/M2 | WEIGHT: 143 LBS | HEART RATE: 64 BPM | OXYGEN SATURATION: 98 % | DIASTOLIC BLOOD PRESSURE: 55 MMHG | TEMPERATURE: 98.2 F | SYSTOLIC BLOOD PRESSURE: 110 MMHG | HEIGHT: 63 IN | RESPIRATION RATE: 16 BRPM

## 2024-04-17 DIAGNOSIS — Z12.11 SCREENING FOR COLON CANCER: Primary | ICD-10-CM

## 2024-04-17 PROCEDURE — 3700000002 HC GENERAL ANESTHESIA TIME - EACH INCREMENTAL 1 MINUTE

## 2024-04-17 PROCEDURE — 45385 COLONOSCOPY W/LESION REMOVAL: CPT | Performed by: INTERNAL MEDICINE

## 2024-04-17 PROCEDURE — 2500000005 HC RX 250 GENERAL PHARMACY W/O HCPCS: Performed by: NURSE ANESTHETIST, CERTIFIED REGISTERED

## 2024-04-17 PROCEDURE — 2500000004 HC RX 250 GENERAL PHARMACY W/ HCPCS (ALT 636 FOR OP/ED): Performed by: NURSE ANESTHETIST, CERTIFIED REGISTERED

## 2024-04-17 PROCEDURE — 7100000009 HC PHASE TWO TIME - INITIAL BASE CHARGE

## 2024-04-17 PROCEDURE — A45385 PR COLONOSCOPY,REMV LESN,SNARE: Performed by: STUDENT IN AN ORGANIZED HEALTH CARE EDUCATION/TRAINING PROGRAM

## 2024-04-17 PROCEDURE — 3700000001 HC GENERAL ANESTHESIA TIME - INITIAL BASE CHARGE

## 2024-04-17 PROCEDURE — 7100000010 HC PHASE TWO TIME - EACH INCREMENTAL 1 MINUTE

## 2024-04-17 PROCEDURE — A45385 PR COLONOSCOPY,REMV LESN,SNARE: Performed by: NURSE ANESTHETIST, CERTIFIED REGISTERED

## 2024-04-17 RX ORDER — OXYCODONE HYDROCHLORIDE 5 MG/1
5 TABLET ORAL EVERY 4 HOURS PRN
OUTPATIENT
Start: 2024-04-17

## 2024-04-17 RX ORDER — LABETALOL HYDROCHLORIDE 5 MG/ML
5 INJECTION, SOLUTION INTRAVENOUS ONCE AS NEEDED
OUTPATIENT
Start: 2024-04-17

## 2024-04-17 RX ORDER — ONDANSETRON HYDROCHLORIDE 2 MG/ML
4 INJECTION, SOLUTION INTRAVENOUS ONCE AS NEEDED
OUTPATIENT
Start: 2024-04-17

## 2024-04-17 RX ORDER — MEPERIDINE HYDROCHLORIDE 50 MG/ML
12.5 INJECTION INTRAMUSCULAR; INTRAVENOUS; SUBCUTANEOUS EVERY 10 MIN PRN
OUTPATIENT
Start: 2024-04-17

## 2024-04-17 RX ORDER — ALBUTEROL SULFATE 0.83 MG/ML
2.5 SOLUTION RESPIRATORY (INHALATION) ONCE AS NEEDED
OUTPATIENT
Start: 2024-04-17

## 2024-04-17 RX ORDER — LIDOCAINE HYDROCHLORIDE 10 MG/ML
0.1 INJECTION INFILTRATION; PERINEURAL ONCE
OUTPATIENT
Start: 2024-04-17 | End: 2024-04-17

## 2024-04-17 RX ORDER — FENTANYL CITRATE 50 UG/ML
25 INJECTION, SOLUTION INTRAMUSCULAR; INTRAVENOUS EVERY 5 MIN PRN
OUTPATIENT
Start: 2024-04-17

## 2024-04-17 RX ORDER — SODIUM CHLORIDE, SODIUM LACTATE, POTASSIUM CHLORIDE, CALCIUM CHLORIDE 600; 310; 30; 20 MG/100ML; MG/100ML; MG/100ML; MG/100ML
20 INJECTION, SOLUTION INTRAVENOUS CONTINUOUS
Status: DISCONTINUED | OUTPATIENT
Start: 2024-04-17 | End: 2024-04-18 | Stop reason: HOSPADM

## 2024-04-17 RX ORDER — LIDOCAINE HYDROCHLORIDE 20 MG/ML
INJECTION, SOLUTION EPIDURAL; INFILTRATION; INTRACAUDAL; PERINEURAL AS NEEDED
Status: DISCONTINUED | OUTPATIENT
Start: 2024-04-17 | End: 2024-04-17

## 2024-04-17 RX ORDER — PROPOFOL 10 MG/ML
INJECTION, EMULSION INTRAVENOUS AS NEEDED
Status: DISCONTINUED | OUTPATIENT
Start: 2024-04-17 | End: 2024-04-17

## 2024-04-17 RX ORDER — SODIUM CHLORIDE, SODIUM LACTATE, POTASSIUM CHLORIDE, CALCIUM CHLORIDE 600; 310; 30; 20 MG/100ML; MG/100ML; MG/100ML; MG/100ML
100 INJECTION, SOLUTION INTRAVENOUS CONTINUOUS
OUTPATIENT
Start: 2024-04-17

## 2024-04-17 RX ORDER — SODIUM CHLORIDE, SODIUM LACTATE, POTASSIUM CHLORIDE, CALCIUM CHLORIDE 600; 310; 30; 20 MG/100ML; MG/100ML; MG/100ML; MG/100ML
INJECTION, SOLUTION INTRAVENOUS CONTINUOUS PRN
Status: DISCONTINUED | OUTPATIENT
Start: 2024-04-17 | End: 2024-04-17

## 2024-04-17 RX ADMIN — PROPOFOL 60 MG: 10 INJECTION, EMULSION INTRAVENOUS at 11:32

## 2024-04-17 RX ADMIN — SODIUM CHLORIDE, POTASSIUM CHLORIDE, SODIUM LACTATE AND CALCIUM CHLORIDE: 600; 310; 30; 20 INJECTION, SOLUTION INTRAVENOUS at 11:28

## 2024-04-17 RX ADMIN — PROPOFOL 50 MG: 10 INJECTION, EMULSION INTRAVENOUS at 11:45

## 2024-04-17 RX ADMIN — PROPOFOL 50 MG: 10 INJECTION, EMULSION INTRAVENOUS at 11:36

## 2024-04-17 RX ADMIN — PROPOFOL 50 MG: 10 INJECTION, EMULSION INTRAVENOUS at 11:40

## 2024-04-17 RX ADMIN — LIDOCAINE HYDROCHLORIDE 60 MG: 20 INJECTION, SOLUTION EPIDURAL; INFILTRATION; INTRACAUDAL; PERINEURAL at 11:32

## 2024-04-17 SDOH — HEALTH STABILITY: MENTAL HEALTH: CURRENT SMOKER: 0

## 2024-04-17 ASSESSMENT — PAIN SCALES - GENERAL
PAINLEVEL_OUTOF10: 0 - NO PAIN

## 2024-04-17 ASSESSMENT — COLUMBIA-SUICIDE SEVERITY RATING SCALE - C-SSRS
1. IN THE PAST MONTH, HAVE YOU WISHED YOU WERE DEAD OR WISHED YOU COULD GO TO SLEEP AND NOT WAKE UP?: NO
6. HAVE YOU EVER DONE ANYTHING, STARTED TO DO ANYTHING, OR PREPARED TO DO ANYTHING TO END YOUR LIFE?: NO
2. HAVE YOU ACTUALLY HAD ANY THOUGHTS OF KILLING YOURSELF?: NO

## 2024-04-17 ASSESSMENT — PAIN - FUNCTIONAL ASSESSMENT
PAIN_FUNCTIONAL_ASSESSMENT: 0-10

## 2024-04-17 NOTE — ANESTHESIA PREPROCEDURE EVALUATION
"Patient: Jordana Haney \"Serina\"    Procedure Information       Date/Time: 04/17/24 1220    Scheduled providers: Lauren Moreau MD    Procedure: COLONOSCOPY    Location: [facility]            Relevant Problems   Cardiac   (+) Atypical chest pain   (+) Chest pain   (+) Hypertension   (+) LBBB (left bundle branch block)   (+) Left bundle branch block (LBBB)      Neuro   (+) Anxiety about health      Endocrine   (+) Hypothyroidism      Hematology   (+) Thalassemia      Musculoskeletal   (+) Degeneration of intervertebral disc of lumbar region   (+) Fibromyalgia      HEENT   (+) Glaucoma       Clinical information reviewed:    Allergies                NPO Detail:  No data recorded     Physical Exam    Airway  Mallampati: II  TM distance: >3 FB  Neck ROM: full     Cardiovascular - normal exam  Rhythm: regular  Rate: normal     Dental - normal exam     Pulmonary - normal exam     Abdominal - normal exam  Abdomen: soft           Anesthesia Plan    History of general anesthesia?: yes  History of complications of general anesthesia?: no    ASA 3     general     The patient is not a current smoker.  Patient was previously instructed to abstain from smoking on day of procedure.  Patient did not smoke on day of procedure.  Education provided regarding risk of obstructive sleep apnea.  intravenous induction   Postoperative administration of opioids is intended.  Anesthetic plan and risks discussed with patient.  Use of blood products discussed with patient who.    Plan discussed with CRNA.    "

## 2024-04-17 NOTE — ANESTHESIA POSTPROCEDURE EVALUATION
"Patient: Jordana Haney \"Serina\"    Procedure Summary       Date: 04/17/24 Room / Location: [facility]    Anesthesia Start: 1128 Anesthesia Stop: 1156    Procedure: COLONOSCOPY Diagnosis:       Screening for colon cancer      Screening for colon cancer    Scheduled Providers: Lauren Moreau MD Responsible Provider: Waldemar Qiu DO    Anesthesia Type: general ASA Status: 3            Anesthesia Type: general    Vitals Value Taken Time   /63 04/17/24 1156   Temp 36 04/17/24 1156   Pulse 71 04/17/24 1156   Resp 18 04/17/24 1156   SpO2 98 04/17/24 1156       Anesthesia Post Evaluation    Patient location during evaluation: PACU  Patient participation: complete - patient participated  Level of consciousness: awake and alert  Pain management: adequate  Airway patency: patent  Cardiovascular status: acceptable  Respiratory status: acceptable  Hydration status: acceptable  Postoperative Nausea and Vomiting: none        There were no known notable events for this encounter.    "

## 2024-04-17 NOTE — DISCHARGE INSTRUCTIONS
Patient Instructions after a Colonoscopy      The anesthetics, sedatives or narcotics which were given to you today will be acting in your body for the next 24 hours, so you might feel a little sleepy or groggy.  This feeling should slowly wear off. Carefully read and follow the instructions.     You received sedation today:  - Do not drive or operate any machinery or power tools of any kind.   - No alcoholic beverages today, not even beer or wine.  - Do not make any important decisions or sign any legal documents.  - No over the counter medications that contain alcohol or that may cause drowsiness.  - Do not make any important decisions or sign any legal documents.    While it is common to experience mild to moderate abdominal distention, gas, or belching after your procedure, if any of these symptoms occur following discharge from the GI Lab or within one week of having your procedure, call the [facility] to be advised whether a visit to your nearest Urgent Care or Emergency Department is indicated.  Take this paper with you if you go.     - If you develop an allergic reaction to the medications that were given during your procedure such as difficulty breathing, rash, hives, severe nausea, vomiting or lightheadedness.  - If you experience chest pain, shortness of breath, severe abdominal pain, fevers and chills.  -If you develop signs and symptoms of bleeding such as blood in your spit, if your stools turn black, tarry, or bloody  - If you have not urinated within 8 hours following your procedure.  - If your IV site becomes painful, red, inflamed, or looks infected.    If you received a biopsy/polypectomy/sphincterotomy the following instructions apply below:    __ Do not use Aspirin containing products, non-steroidal medications or anti-coagulants for one week following your procedure. (Examples of these types of medications are: Advil, Arthrotec, Aleve, Coumadin, Ecotrin, Heparin, Ibuprofen,  Indocin, Motrin, Naprosyn, Nuprin, Plavix, Vioxx, and Voltarin, or their generic forms.  This list is not all-inclusive.  Check with your physician or pharmacist before resuming medications.)   __ Eat a soft diet today.  Avoid foods that are poorly digested for the next 24 hours.  These foods would include: nuts, beans, lettuce, red meats, and fried foods. Start with liquids and advance your diet as tolerated, gradually work up to eating solids.   __ Do not have a Barium Study or Enema for one week.    Your physician recommends the additional following instructions:    -You have a contact number available for emergencies. The signs and symptoms of potential delayed complications were discussed with you. You may return to normal activities tomorrow.  -Resume your previous diet.  -Continue your present medications.   -We are waiting for your pathology results.  -Your physician has recommended a repeat colonoscopy (date to be determined after pending pathology results are reviewed) for surveillance based on pathology results.  -The findings and recommendations have been discussed with you.  -The findings and recommendations were discussed with your family.  - Please see Medication Reconciliation Form for new medication/medications prescribed.       If you experience any problems or have any questions following discharge from the GI Lab, please call:        Nurse Signature                                                                        Date___________________                                                                            Patient/Responsible Party Signature                                        Date___________________

## 2024-04-22 ENCOUNTER — TELEPHONE (OUTPATIENT)
Dept: PRIMARY CARE | Facility: CLINIC | Age: 69
End: 2024-04-22
Payer: MEDICARE

## 2024-04-22 NOTE — TELEPHONE ENCOUNTER
Pt of Dr Jeffers. Requested an appt for possible angina but upon speaking with her she believed this was due to starting her omeprazole. She has since stopped her omeprazole and saw Katty to address her Amlodipine and decrease that as well. Pt states the pain is less and would call back if it increased or came back. I advised pt if this worsened she should go to the ER. Please advise this is an FYI to see if you would like to advise anything differently. Thank you!

## 2024-04-24 ENCOUNTER — APPOINTMENT (OUTPATIENT)
Dept: GENERAL RADIOLOGY | Age: 69
End: 2024-04-24
Payer: MEDICARE

## 2024-04-24 ENCOUNTER — HOSPITAL ENCOUNTER (EMERGENCY)
Age: 69
Discharge: HOME | End: 2024-04-24
Attending: STUDENT IN AN ORGANIZED HEALTH CARE EDUCATION/TRAINING PROGRAM
Payer: MEDICARE

## 2024-04-24 VITALS
WEIGHT: 140 LBS | BODY MASS INDEX: 24.03 KG/M2 | TEMPERATURE: 98 F | DIASTOLIC BLOOD PRESSURE: 85 MMHG | RESPIRATION RATE: 18 BRPM | HEART RATE: 95 BPM | OXYGEN SATURATION: 97 % | SYSTOLIC BLOOD PRESSURE: 183 MMHG

## 2024-04-24 DIAGNOSIS — R07.9 CHEST PAIN, UNSPECIFIED TYPE: Primary | ICD-10-CM

## 2024-04-24 DIAGNOSIS — R10.13 ABDOMINAL PAIN, EPIGASTRIC: ICD-10-CM

## 2024-04-24 LAB
ALBUMIN SERPL-MCNC: 4.5 G/DL (ref 3.5–4.6)
ALP SERPL-CCNC: 75 U/L (ref 40–130)
ALT SERPL-CCNC: 15 U/L (ref 0–33)
ANION GAP SERPL CALCULATED.3IONS-SCNC: 11 MEQ/L (ref 9–15)
ANISOCYTOSIS BLD QL SMEAR: ABNORMAL
AST SERPL-CCNC: 16 U/L (ref 0–35)
BASOPHILS # BLD: 0.1 K/UL (ref 0–0.2)
BASOPHILS NFR BLD: 1 %
BILIRUB SERPL-MCNC: 0.3 MG/DL (ref 0.2–0.7)
BUN SERPL-MCNC: 19 MG/DL (ref 8–23)
CALCIUM SERPL-MCNC: 10.3 MG/DL (ref 8.5–9.9)
CHLORIDE SERPL-SCNC: 100 MEQ/L (ref 95–107)
CO2 SERPL-SCNC: 27 MEQ/L (ref 20–31)
CREAT SERPL-MCNC: 0.76 MG/DL (ref 0.5–0.9)
EOSINOPHIL # BLD: 0 K/UL (ref 0–0.7)
EOSINOPHIL NFR BLD: 1.7 %
ERYTHROCYTE [DISTWIDTH] IN BLOOD BY AUTOMATED COUNT: 17.4 % (ref 11.5–14.5)
GLOBULIN SER CALC-MCNC: 2.7 G/DL (ref 2.3–3.5)
GLUCOSE SERPL-MCNC: 119 MG/DL (ref 70–99)
HCT VFR BLD AUTO: 39.8 % (ref 37–47)
HGB BLD-MCNC: 12 G/DL (ref 12–16)
LIPASE SERPL-CCNC: 44 U/L (ref 12–95)
LYMPHOCYTES # BLD: 3 K/UL (ref 1–4.8)
LYMPHOCYTES NFR BLD: 31 %
MCH RBC QN AUTO: 19.4 PG (ref 27–31.3)
MCHC RBC AUTO-ENTMCNC: 30.2 % (ref 33–37)
MCV RBC AUTO: 64.2 FL (ref 79.4–94.8)
MICROCYTES BLD QL SMEAR: ABNORMAL
MONOCYTES # BLD: 0.1 K/UL (ref 0.2–0.8)
MONOCYTES NFR BLD: 1 %
NEUTROPHILS # BLD: 4.5 K/UL (ref 1.4–6.5)
NEUTS SEG NFR BLD: 59 %
PATH INTERP BLD-IMP: YES
PLATELET # BLD AUTO: 366 K/UL (ref 130–400)
PLATELET BLD QL SMEAR: ADEQUATE
POTASSIUM SERPL-SCNC: 4.1 MEQ/L (ref 3.4–4.9)
PROT SERPL-MCNC: 7.2 G/DL (ref 6.3–8)
RBC # BLD AUTO: 6.2 M/UL (ref 4.2–5.4)
SCHISTOCYTES BLD QL SMEAR: ABNORMAL
SLIDE REVIEW: ABNORMAL
SMUDGE CELLS BLD QL SMEAR: 12.4
SODIUM SERPL-SCNC: 138 MEQ/L (ref 135–144)
TROPONIN, HIGH SENSITIVITY: 7 NG/L (ref 0–19)
TROPONIN, HIGH SENSITIVITY: 8 NG/L (ref 0–19)
VARIANT LYMPHS NFR BLD: 8 %
WBC # BLD AUTO: 7.7 K/UL (ref 4.8–10.8)

## 2024-04-24 PROCEDURE — 71045 X-RAY EXAM CHEST 1 VIEW: CPT

## 2024-04-24 PROCEDURE — 93005 ELECTROCARDIOGRAM TRACING: CPT | Performed by: STUDENT IN AN ORGANIZED HEALTH CARE EDUCATION/TRAINING PROGRAM

## 2024-04-24 PROCEDURE — 83690 ASSAY OF LIPASE: CPT

## 2024-04-24 PROCEDURE — 6370000000 HC RX 637 (ALT 250 FOR IP): Performed by: STUDENT IN AN ORGANIZED HEALTH CARE EDUCATION/TRAINING PROGRAM

## 2024-04-24 PROCEDURE — 80053 COMPREHEN METABOLIC PANEL: CPT

## 2024-04-24 PROCEDURE — 36415 COLL VENOUS BLD VENIPUNCTURE: CPT

## 2024-04-24 PROCEDURE — 99285 EMERGENCY DEPT VISIT HI MDM: CPT

## 2024-04-24 PROCEDURE — 84484 ASSAY OF TROPONIN QUANT: CPT

## 2024-04-24 PROCEDURE — 85025 COMPLETE CBC W/AUTO DIFF WBC: CPT

## 2024-04-24 RX ORDER — LISINOPRIL 10 MG/1
10 TABLET ORAL DAILY
COMMUNITY
Start: 2024-04-10 | End: 2024-07-09

## 2024-04-24 RX ORDER — TIMOLOL MALEATE 2.5 MG/ML
SOLUTION OPHTHALMIC
COMMUNITY
Start: 2019-09-06

## 2024-04-24 RX ORDER — TRAZODONE HYDROCHLORIDE 50 MG/1
50 TABLET ORAL NIGHTLY
COMMUNITY
Start: 2024-02-27 | End: 2025-02-26

## 2024-04-24 RX ORDER — MECLIZINE HCL 12.5 MG/1
TABLET ORAL
COMMUNITY
Start: 2021-05-04

## 2024-04-24 RX ORDER — LEVOTHYROXINE SODIUM 0.05 MG/1
50 TABLET ORAL
COMMUNITY
Start: 2019-09-06

## 2024-04-24 RX ADMIN — Medication: at 21:41

## 2024-04-24 ASSESSMENT — LIFESTYLE VARIABLES
HOW OFTEN DO YOU HAVE A DRINK CONTAINING ALCOHOL: NEVER
HOW MANY STANDARD DRINKS CONTAINING ALCOHOL DO YOU HAVE ON A TYPICAL DAY: PATIENT DOES NOT DRINK

## 2024-04-24 ASSESSMENT — PAIN SCALES - GENERAL: PAINLEVEL_OUTOF10: 8

## 2024-04-24 ASSESSMENT — PAIN - FUNCTIONAL ASSESSMENT: PAIN_FUNCTIONAL_ASSESSMENT: 0-10

## 2024-04-24 NOTE — ED TRIAGE NOTES
Pt arrived to ED with c/o of chest pain x3 weeks. PT states her pain increased more today. Pt states she was started on Prilosec a few weeks ago and stopped because she thought it was making her chest pain worse. Pt states she has recent changes to her hypertension meds. Pt is a&ox4.

## 2024-04-24 NOTE — TELEPHONE ENCOUNTER
Please advise where you would like pt added on. You have limited availability and you aren't in some days. Thank you!

## 2024-04-25 LAB
EKG ATRIAL RATE: 60 BPM
EKG P AXIS: 65 DEGREES
EKG P-R INTERVAL: 182 MS
EKG Q-T INTERVAL: 436 MS
EKG QRS DURATION: 138 MS
EKG QTC CALCULATION (BAZETT): 436 MS
EKG R AXIS: -22 DEGREES
EKG T AXIS: 99 DEGREES
EKG VENTRICULAR RATE: 60 BPM
PATH INTERP BLD-IMP: NORMAL

## 2024-04-25 NOTE — ED NOTES
Discharge and education instructions reviewed. Patient verbalized understanding, teach-back successful. Patient denied questions at this time. No acute distress noted. Patient instructed to follow-up as noted - return to emergency department if symptoms worsen. Patient verbalized understanding. Discharged per EDMD with discharge instructions. \

## 2024-04-25 NOTE — ED PROVIDER NOTES
pain, epigastric          DISPOSITION / PLAN     DISPOSITION        PATIENT REFERREDTO:  No follow-up provider specified.    DISCHARGE MEDICATIONS:  New Prescriptions    No medications on file       Cristopher Perez DO  Emergency Medicine Physician  04/24/24 10:14 PM        (Please note that portions of this note were completed with a voice recognition program.Efforts were made to edit the dictations but occasionally words are mis-transcribed.)        Manjeet Rodriguez PA-C  04/24/24 [id number]

## 2024-04-25 NOTE — DISCHARGE INSTRUCTIONS
Please call your primary doctor tomorrow for a follow-up appointment     Take Pepcid 20 mg over-the-counter daily for reflux symptoms    Avoid spicy or greasy foods, fast foods, eating late at night, sodas, alcohol or ibuprofen or steroids as this can make her gastritis worse     take your medication as indicated.  For pain use ibuprofen (Motrin / Advil) or acetaminophen (Tylenol), unless prescribed medications that have acetaminophen in it.  You can take over the counter acetaminophen tablets (1 - 2 tablets of the 500-mg strength every 6 hours) or ibuprofen tablets (2 tablets every 4 hours).    If you have not had a stress test in over a year your primary care physician may order this test as further work-up for your chest pain.  If you have a cardiologist, then you should also call them to discuss further treatment options.    PLEASE RETURN TO THE EMERGENCY DEPARTMENT IMMEDIATELY for worsening symptoms of increasing pain, shortness of breath, feeling of your heart fluttering or racing, swelling to your feet, unable to lay flat, or if you develop any concerning symptoms such as: high fever not relieved by acetaminophen (Tylenol) and/or ibuprofen (Motrin / Advil), chills, persistent nausea and/or vomiting, loss of consciousness, numbness, weakness or tingling in the arms or legs or change in color of the extremities, changes in mental status, persistent headache, blurry vision, loss of bladder / bowel control, unable to follow up with your physician, or other any other care or concern.

## 2024-05-02 ENCOUNTER — OFFICE VISIT (OUTPATIENT)
Dept: PRIMARY CARE | Facility: CLINIC | Age: 69
End: 2024-05-02
Payer: MEDICARE

## 2024-05-02 VITALS
OXYGEN SATURATION: 98 % | BODY MASS INDEX: 25.66 KG/M2 | HEIGHT: 63 IN | HEART RATE: 64 BPM | WEIGHT: 144.8 LBS | DIASTOLIC BLOOD PRESSURE: 80 MMHG | SYSTOLIC BLOOD PRESSURE: 146 MMHG

## 2024-05-02 DIAGNOSIS — R07.9 CHEST PAIN, UNSPECIFIED TYPE: Primary | ICD-10-CM

## 2024-05-02 DIAGNOSIS — K21.9 GASTROESOPHAGEAL REFLUX DISEASE WITHOUT ESOPHAGITIS: ICD-10-CM

## 2024-05-02 PROCEDURE — 3077F SYST BP >= 140 MM HG: CPT | Performed by: INTERNAL MEDICINE

## 2024-05-02 PROCEDURE — 99214 OFFICE O/P EST MOD 30 MIN: CPT | Performed by: INTERNAL MEDICINE

## 2024-05-02 PROCEDURE — G2211 COMPLEX E/M VISIT ADD ON: HCPCS | Performed by: INTERNAL MEDICINE

## 2024-05-02 PROCEDURE — 3079F DIAST BP 80-89 MM HG: CPT | Performed by: INTERNAL MEDICINE

## 2024-05-02 PROCEDURE — 1159F MED LIST DOCD IN RCRD: CPT | Performed by: INTERNAL MEDICINE

## 2024-05-02 PROCEDURE — 1123F ACP DISCUSS/DSCN MKR DOCD: CPT | Performed by: INTERNAL MEDICINE

## 2024-05-02 RX ORDER — ESOMEPRAZOLE MAGNESIUM 40 MG/1
40 CAPSULE, DELAYED RELEASE ORAL
Qty: 30 CAPSULE | Refills: 1 | Status: SHIPPED | OUTPATIENT
Start: 2024-05-02 | End: 2024-07-01

## 2024-05-02 NOTE — PROGRESS NOTES
" Subjective   Patient ID: [id number]     Jordana Haney \"Serina\" is a 68 y.o. female who presents for Chest Pain (Patient states that chest pain has subsided. Patient was sen in the ER and was told everything was normal. ).    Current Outpatient Medications:     amLODIPine (Norvasc) 5 mg tablet, Take 1 tablet (5 mg) by mouth once daily., Disp: 90 tablet, Rfl: 0    levothyroxine (Synthroid, Levoxyl) 50 mcg tablet, Take 1 tablet (50 mcg) by mouth once daily in the morning. Take before meals., Disp: 90 tablet, Rfl: 3    lisinopril 10 mg tablet, Take 1 tablet (10 mg) by mouth once daily., Disp: 90 tablet, Rfl: 0    multivit-min/ferrous fumarate (MULTI VITAMIN ORAL), On occasion, Disp: , Rfl:     rosuvastatin (Crestor) 10 mg tablet, Take 1 tablet (10 mg) by mouth once daily., Disp: 90 tablet, Rfl: 3    timolol (Timoptic) 0.5 % ophthalmic solution, INSTILL ONE DROP INTO BOTH EYES IN THE MORNING, Disp: , Rfl:     traZODone (Desyrel) 50 mg tablet, Take 1 tablet (50 mg) by mouth once daily at bedtime., Disp: 30 tablet, Rfl: 3    esomeprazole (NexIUM) 40 mg DR capsule, Take 1 capsule (40 mg) by mouth once daily in the morning. Take before meals. Do not open capsule., Disp: 30 capsule, Rfl: 1  HPI  68-year-old patient presented to the office today for follow-up visit after her recent ER presentation patient had presented to the emergency room on patient had presented to the ER on April 24 for evaluation of off-and-on episodes of chest pain that started on April 4.  Initially the patient thought that Prilosec was causing her chest pain, occasionally it was prescribed for her for heartburn and acid reflux so she did suspend the Prilosec but she continued to experience off-and-on episodes of chest discomfort, no shortness of breath no other symptoms associated with it.  In the emergency room they did an EKG and blood work and they told her to take Pepcid on as-needed basis.    Patient tells me she still experiences these episodes " "off-and-on could be related to exertion but they could happen at any time no shortness of breath with exertion no palpitations no dizziness no lightheadedness.  She still does experience occasional heartburn and acid reflux but not as bad as before.      Review of system was reviewed all normal except what is noted in HPI   Past Medical History:   Diagnosis Date    Elevated cholesterol     Glaucoma     Hypertension     Hypothyroidism       Objective   /80 (BP Location: Right arm, Patient Position: Sitting)   Pulse 64   Ht 1.6 m (5' 3\")   Wt 65.7 kg (144 lb 12.8 oz)   SpO2 98%   BMI 25.65 kg/m²      Physical Exam  Constitutional:       Appearance: Normal appearance.   Cardiovascular:      Rate and Rhythm: Normal rate and regular rhythm.   Pulmonary:      Effort: Pulmonary effort is normal.      Breath sounds: Normal breath sounds.   Neurological:      Mental Status: She is alert.         Assessment/Plan   Problem List Items Addressed This Visit          Cardiac and Vasculature    Chest pain - Primary    Relevant Orders    Echocardiogram Stress Test     Other Visit Diagnoses       Gastroesophageal reflux disease without esophagitis        Relevant Medications    esomeprazole (NexIUM) 40 mg DR capsule          68-year-old patient with the following issues.    1.  Status post recent ER visit for evaluation of elbow pain and episodes of chest pain off-and-on, at this point I did recommend the patient to proceed with stress test I would discuss the results with her once available and if abnormal we will refer to cardiology patient stated understanding.    2.  Hyperlipidemia on rosuvastatin the plan is to continue no changes.    3.  Acid reflux patient does not want to go back on Prilosec we will try Nexium and hopefully that we will be beneficial.    4.  Essential benign hypertension patient is only taking amlodipine she is not taking the lisinopril and her home readings are excellent her machine has been " checked and it is accurate her blood pressure always higher when she is in the exam room as provided previously.    No other active issues or concerns I will discuss the results with the patient].  Patricia Crawley MD

## 2024-05-08 ENCOUNTER — HOSPITAL ENCOUNTER (OUTPATIENT)
Dept: CARDIOLOGY | Facility: CLINIC | Age: 69
Discharge: HOME | End: 2024-05-08
Payer: MEDICARE

## 2024-05-08 DIAGNOSIS — R07.9 CHEST PAIN, UNSPECIFIED TYPE: ICD-10-CM

## 2024-05-08 PROCEDURE — 93016 CV STRESS TEST SUPVJ ONLY: CPT | Performed by: INTERNAL MEDICINE

## 2024-05-08 PROCEDURE — 93350 STRESS TTE ONLY: CPT | Performed by: INTERNAL MEDICINE

## 2024-05-08 PROCEDURE — 93018 CV STRESS TEST I&R ONLY: CPT | Performed by: INTERNAL MEDICINE

## 2024-05-08 PROCEDURE — 93017 CV STRESS TEST TRACING ONLY: CPT

## 2024-06-11 ENCOUNTER — APPOINTMENT (OUTPATIENT)
Dept: PRIMARY CARE | Facility: CLINIC | Age: 69
End: 2024-06-11
Payer: MEDICARE

## 2024-06-14 ENCOUNTER — APPOINTMENT (OUTPATIENT)
Dept: PRIMARY CARE | Facility: CLINIC | Age: 69
End: 2024-06-14
Payer: MEDICARE

## 2024-06-14 VITALS
OXYGEN SATURATION: 99 % | HEART RATE: 64 BPM | HEIGHT: 63 IN | SYSTOLIC BLOOD PRESSURE: 130 MMHG | BODY MASS INDEX: 25.52 KG/M2 | DIASTOLIC BLOOD PRESSURE: 70 MMHG | WEIGHT: 144 LBS | RESPIRATION RATE: 14 BRPM

## 2024-06-14 DIAGNOSIS — R00.2 PALPITATION: ICD-10-CM

## 2024-06-14 DIAGNOSIS — R14.0 ABDOMINAL BLOATING: Primary | ICD-10-CM

## 2024-06-14 PROCEDURE — 3075F SYST BP GE 130 - 139MM HG: CPT | Performed by: INTERNAL MEDICINE

## 2024-06-14 PROCEDURE — 1159F MED LIST DOCD IN RCRD: CPT | Performed by: INTERNAL MEDICINE

## 2024-06-14 PROCEDURE — 3078F DIAST BP <80 MM HG: CPT | Performed by: INTERNAL MEDICINE

## 2024-06-14 PROCEDURE — 1036F TOBACCO NON-USER: CPT | Performed by: INTERNAL MEDICINE

## 2024-06-14 PROCEDURE — 1123F ACP DISCUSS/DSCN MKR DOCD: CPT | Performed by: INTERNAL MEDICINE

## 2024-06-14 PROCEDURE — 99214 OFFICE O/P EST MOD 30 MIN: CPT | Performed by: INTERNAL MEDICINE

## 2024-06-14 NOTE — PROGRESS NOTES
"Subjective   Patient ID: [id number]     Jordana Haney \"Alexsander" is a 68 y.o. female who presents for Follow-up (Here for a 3 month follow up ).  Current Outpatient Medications:     amLODIPine (Norvasc) 5 mg tablet, Take 1 tablet (5 mg) by mouth once daily., Disp: 90 tablet, Rfl: 0    esomeprazole (NexIUM) 40 mg DR capsule, Take 1 capsule (40 mg) by mouth once daily in the morning. Take before meals. Do not open capsule., Disp: 30 capsule, Rfl: 1    levothyroxine (Synthroid, Levoxyl) 50 mcg tablet, Take 1 tablet (50 mcg) by mouth once daily in the morning. Take before meals., Disp: 90 tablet, Rfl: 3    multivit-min/ferrous fumarate (MULTI VITAMIN ORAL), On occasion, Disp: , Rfl:     rosuvastatin (Crestor) 10 mg tablet, Take 1 tablet (10 mg) by mouth once daily., Disp: 90 tablet, Rfl: 3    timolol (Timoptic) 0.5 % ophthalmic solution, INSTILL ONE DROP INTO BOTH EYES IN THE MORNING, Disp: , Rfl:     traZODone (Desyrel) 50 mg tablet, Take 1 tablet (50 mg) by mouth once daily at bedtime., Disp: 30 tablet, Rfl: 3    lisinopril 10 mg tablet, Take 1 tablet (10 mg) by mouth once daily., Disp: 90 tablet, Rfl: 0  HPI  68-year-old patient presented to the office today for a follow-up visit.    Last time I saw the patient in my office she was experiencing twinges in her chest and we did the stress test that came back completely normal after that she started on Nexium she was very against starting Prilosec but Nexium seem to help her have reflux has been well-controlled and the episodes of chest pain and discomfort have resolved completely almost.  Occasionally she has abdominal bloating and distention she was wondering about her gallbladder she has never had recent ultrasound patient denies any right upper quadrant pain, no nausea no vomiting no bloating with occasional distention.  She also is concerned about a soft tissue lump on the met back on the right side, like in the mid axillary line she noticed it within the last 2 " "months.  She provided me with her home blood pressure readings and they seem to be excellent she is currently only on the amlodipine.  Review of system was reviewed all normal except what is noted in HPI   Past Medical History:   Diagnosis Date    Elevated cholesterol     Glaucoma     Hypertension     Hypothyroidism       Objective   /70   Pulse 64   Resp 14   Ht 1.6 m (5' 3\")   Wt 65.3 kg (144 lb)   SpO2 99%   BMI 25.51 kg/m²      Physical Exam  Constitutional:       Appearance: Normal appearance.   Cardiovascular:      Rate and Rhythm: Normal rate and regular rhythm.      Pulses: Normal pulses.      Heart sounds: Normal heart sounds.   Pulmonary:      Effort: Pulmonary effort is normal.      Breath sounds: Normal breath sounds.   Neurological:      Mental Status: She is alert.         Assessment/Plan   Problem List Items Addressed This Visit    None    68-year-old patient with the following issues.    1.  Status post recent evaluation for episodes of twinges in the chest status post stress test that came back completely normal improved currently with the use of PPI the plan is to continue no changes.    2.  Soft tissue lump in the mid upper back area on the right side ultrasound will be obtained we will discuss the results with the patient once available.    3.  Occasional episodes of abdominal bloating and distention and concerns regarding gallbladder ultrasound will be requested I will discuss the results with her once available.    4.  Essential benign hypertension her blood pressure is adequate continue the current medications no changes.    No other active issues or concerns during this visit see the patient back in the office previously scheduled and sooner if.  Patricia Crawley MD   "

## 2024-06-17 ENCOUNTER — HOSPITAL ENCOUNTER (OUTPATIENT)
Dept: CARDIOLOGY | Facility: CLINIC | Age: 69
Discharge: HOME | End: 2024-06-17
Payer: MEDICARE

## 2024-06-17 DIAGNOSIS — R00.2 PALPITATION: ICD-10-CM

## 2024-06-17 PROCEDURE — 93242 EXT ECG>48HR<7D RECORDING: CPT

## 2024-06-24 ENCOUNTER — PATIENT MESSAGE (OUTPATIENT)
Dept: PRIMARY CARE | Facility: CLINIC | Age: 69
End: 2024-06-24
Payer: MEDICARE

## 2024-06-24 DIAGNOSIS — R22.31 AXILLARY LUMP, RIGHT: ICD-10-CM

## 2024-06-25 NOTE — TELEPHONE ENCOUNTER
From: Jordana Haney  To: Patricia TreadwellBseiso  Sent: 6/24/2024 6:50 PM EDT  Subject: Ultrasound for lump    Just as a reminder, please put an order in for an ultrasound on my lump on my right side right around my bra line. You weren't sure how to word the request during my office visit. The ultrasound for my abdomen hasn't been scheduled yet. Thank you, Serina

## 2024-06-28 DIAGNOSIS — R10.11 RUQ PAIN: Primary | ICD-10-CM

## 2024-07-03 ENCOUNTER — HOSPITAL ENCOUNTER (OUTPATIENT)
Dept: RADIOLOGY | Facility: HOSPITAL | Age: 69
Discharge: HOME | End: 2024-07-03
Payer: MEDICARE

## 2024-07-03 DIAGNOSIS — R22.31 AXILLARY LUMP, RIGHT: ICD-10-CM

## 2024-07-03 DIAGNOSIS — R10.11 RUQ PAIN: ICD-10-CM

## 2024-07-03 PROCEDURE — 76705 ECHO EXAM OF ABDOMEN: CPT

## 2024-07-03 PROCEDURE — 76604 US EXAM CHEST: CPT | Performed by: STUDENT IN AN ORGANIZED HEALTH CARE EDUCATION/TRAINING PROGRAM

## 2024-07-03 PROCEDURE — 76705 ECHO EXAM OF ABDOMEN: CPT | Performed by: STUDENT IN AN ORGANIZED HEALTH CARE EDUCATION/TRAINING PROGRAM

## 2024-07-03 PROCEDURE — 76604 US EXAM CHEST: CPT

## 2024-09-09 DIAGNOSIS — I10 HYPERTENSION, UNSPECIFIED TYPE: ICD-10-CM

## 2024-09-09 RX ORDER — AMLODIPINE BESYLATE 5 MG/1
5 TABLET ORAL DAILY
Qty: 90 TABLET | Refills: 0 | Status: SHIPPED | OUTPATIENT
Start: 2024-09-09

## 2024-09-10 ENCOUNTER — TELEPHONE (OUTPATIENT)
Dept: PRIMARY CARE | Facility: CLINIC | Age: 69
End: 2024-09-10
Payer: MEDICARE

## 2024-09-10 RX ORDER — AMLODIPINE BESYLATE 5 MG/1
5 TABLET ORAL DAILY
Qty: 90 TABLET | Refills: 1 | OUTPATIENT
Start: 2024-09-10

## 2024-11-12 ENCOUNTER — TELEPHONE (OUTPATIENT)
Dept: PRIMARY CARE | Facility: CLINIC | Age: 69
End: 2024-11-12
Payer: MEDICARE

## 2024-11-14 DIAGNOSIS — K21.9 GASTROESOPHAGEAL REFLUX DISEASE WITHOUT ESOPHAGITIS: Primary | ICD-10-CM

## 2024-12-05 DIAGNOSIS — I10 HYPERTENSION, UNSPECIFIED TYPE: ICD-10-CM

## 2024-12-05 RX ORDER — AMLODIPINE BESYLATE 5 MG/1
5 TABLET ORAL DAILY
Qty: 90 TABLET | Refills: 0 | Status: SHIPPED | OUTPATIENT
Start: 2024-12-05 | End: 2024-12-06

## 2024-12-06 DIAGNOSIS — I10 HYPERTENSION, UNSPECIFIED TYPE: ICD-10-CM

## 2024-12-06 RX ORDER — AMLODIPINE BESYLATE 5 MG/1
5 TABLET ORAL DAILY
Qty: 90 TABLET | Refills: 0 | Status: SHIPPED | OUTPATIENT
Start: 2024-12-06

## 2024-12-13 DIAGNOSIS — E03.9 ACQUIRED HYPOTHYROIDISM: ICD-10-CM

## 2024-12-13 RX ORDER — LEVOTHYROXINE SODIUM 50 UG/1
50 TABLET ORAL
Qty: 90 TABLET | Refills: 3 | Status: SHIPPED | OUTPATIENT
Start: 2024-12-13

## 2024-12-19 DIAGNOSIS — E78.5 HYPERLIPIDEMIA, UNSPECIFIED HYPERLIPIDEMIA TYPE: ICD-10-CM

## 2024-12-19 RX ORDER — ROSUVASTATIN CALCIUM 10 MG/1
10 TABLET, COATED ORAL DAILY
Qty: 90 TABLET | Refills: 3 | Status: SHIPPED | OUTPATIENT
Start: 2024-12-19

## 2025-01-29 ENCOUNTER — OFFICE VISIT (OUTPATIENT)
Dept: GASTROENTEROLOGY | Facility: CLINIC | Age: 70
End: 2025-01-29
Payer: MEDICARE

## 2025-01-29 VITALS
WEIGHT: 143 LBS | SYSTOLIC BLOOD PRESSURE: 155 MMHG | DIASTOLIC BLOOD PRESSURE: 84 MMHG | HEIGHT: 63 IN | TEMPERATURE: 97.6 F | BODY MASS INDEX: 25.34 KG/M2 | HEART RATE: 66 BPM

## 2025-01-29 DIAGNOSIS — R07.89 ATYPICAL CHEST PAIN: Primary | ICD-10-CM

## 2025-01-29 DIAGNOSIS — K21.9 GASTROESOPHAGEAL REFLUX DISEASE WITHOUT ESOPHAGITIS: ICD-10-CM

## 2025-01-29 DIAGNOSIS — R05.3 CHRONIC COUGH: ICD-10-CM

## 2025-01-29 PROCEDURE — 1160F RVW MEDS BY RX/DR IN RCRD: CPT | Performed by: NURSE PRACTITIONER

## 2025-01-29 PROCEDURE — 3008F BODY MASS INDEX DOCD: CPT | Performed by: NURSE PRACTITIONER

## 2025-01-29 PROCEDURE — 1123F ACP DISCUSS/DSCN MKR DOCD: CPT | Performed by: NURSE PRACTITIONER

## 2025-01-29 PROCEDURE — 1126F AMNT PAIN NOTED NONE PRSNT: CPT | Performed by: NURSE PRACTITIONER

## 2025-01-29 PROCEDURE — 99214 OFFICE O/P EST MOD 30 MIN: CPT | Performed by: NURSE PRACTITIONER

## 2025-01-29 PROCEDURE — 1159F MED LIST DOCD IN RCRD: CPT | Performed by: NURSE PRACTITIONER

## 2025-01-29 PROCEDURE — 3079F DIAST BP 80-89 MM HG: CPT | Performed by: NURSE PRACTITIONER

## 2025-01-29 PROCEDURE — 99204 OFFICE O/P NEW MOD 45 MIN: CPT | Performed by: NURSE PRACTITIONER

## 2025-01-29 PROCEDURE — 3077F SYST BP >= 140 MM HG: CPT | Performed by: NURSE PRACTITIONER

## 2025-01-29 RX ORDER — FAMOTIDINE 40 MG/1
40 TABLET, FILM COATED ORAL DAILY
Qty: 30 TABLET | Refills: 11 | Status: SHIPPED | OUTPATIENT
Start: 2025-01-29 | End: 2026-01-29

## 2025-01-29 ASSESSMENT — ENCOUNTER SYMPTOMS
DIFFICULTY URINATING: 0
PSYCHIATRIC NEGATIVE: 1
DIAPHORESIS: 0
WHEEZING: 0
CARDIOVASCULAR NEGATIVE: 1
MUSCULOSKELETAL NEGATIVE: 1
FATIGUE: 0
APNEA: 0
ENDOCRINE NEGATIVE: 1
HEMATOLOGIC/LYMPHATIC NEGATIVE: 1
CHILLS: 0
NEUROLOGICAL NEGATIVE: 1
ROS GI COMMENTS: SEE HPI
STRIDOR: 0
FEVER: 0
CHEST TIGHTNESS: 0
EYES NEGATIVE: 1
COUGH: 0
ALLERGIC/IMMUNOLOGIC NEGATIVE: 1
SHORTNESS OF BREATH: 0
RESPIRATORY NEGATIVE: 1

## 2025-01-29 ASSESSMENT — PAIN SCALES - GENERAL: PAINLEVEL_OUTOF10: 0-NO PAIN

## 2025-01-29 NOTE — PROGRESS NOTES
"Subjective   Patient ID: Jordana Haney \"Alexsander" is a 69 y.o. female who presents for New Patient Visit. PMH: hypothyroidism, HTN, HLD     Denies sleep apnea, diabetes, pacer/defib, blood thinners     Presents today for GERD. Has been doing well for the last month.   She has episodes of flutters in her throat, sometimes she has had pain, she reports in the past had a cardiac work-up  She feels the reflux up into her face, sore throat and coughing  No precipitating events she can recall. She denies regular NSAID use. She uses this rarely.     This started about 1 year ago. Has had reflux in the past but not similar to this. Prior to this it was happening  This seems to be alleviated with Tums, however does not always work.   She tried Prilosec and this did not help, she took Nexium for 6 weeks and \"this did not work\"  Had an US that was normal.     She denies coughing, choking on food, dysphagia, dark tarry stools, abdominal pain, vomiting    She has chronic constipation. This is her normal. She has started metamucil and this helps.     Colonoscopy in  found one polyp in the transverse colon    Family Hx: She denies a family hx of CRC, GI cancers  P. Grandmother  of ?stomach cancers     Review of Systems   Constitutional:  Negative for chills, diaphoresis, fatigue and fever.   HENT: Negative.     Eyes: Negative.    Respiratory: Negative.  Negative for apnea, cough, chest tightness, shortness of breath, wheezing and stridor.    Cardiovascular: Negative.    Gastrointestinal:         See HPI    Endocrine: Negative.    Genitourinary: Negative.  Negative for difficulty urinating.   Musculoskeletal: Negative.    Skin: Negative.    Allergic/Immunologic: Negative.    Neurological: Negative.    Hematological: Negative.    Psychiatric/Behavioral: Negative.         Objective   Physical Exam  Constitutional:       Appearance: Normal appearance. She is normal weight.   HENT:      Nose: Nose normal.   Eyes:      General: Lids " are normal.   Cardiovascular:      Rate and Rhythm: Normal rate and regular rhythm.      Heart sounds: Normal heart sounds.   Pulmonary:      Effort: Pulmonary effort is normal.      Breath sounds: Normal breath sounds.   Abdominal:      General: Bowel sounds are normal.   Musculoskeletal:         General: Normal range of motion.   Skin:     General: Skin is warm and dry.   Neurological:      Mental Status: She is alert and oriented to person, place, and time.   Psychiatric:         Mood and Affect: Mood normal.       Assessment/Plan   Diagnoses and all orders for this visit:  Atypical chest pain  -     Esophagogastroduodenoscopy (EGD); Future  -     famotidine (Pepcid) 40 mg tablet; Take 1 tablet (40 mg) by mouth once daily.  Gastroesophageal reflux disease without esophagitis  -     Referral to Gastroenterology  -     Esophagogastroduodenoscopy (EGD); Future  -     famotidine (Pepcid) 40 mg tablet; Take 1 tablet (40 mg) by mouth once daily.  Chronic cough  -     Esophagogastroduodenoscopy (EGD); Future  -     famotidine (Pepcid) 40 mg tablet; Take 1 tablet (40 mg) by mouth once daily.      68 yo female with a h/o hypothyroidism, HTN, HLD presents today for a 1 year h/o throat spasms, coughing, facial flushing. She was tried on multiple PPIs however her symptoms persisted intermittently. We discussed symptoms could be associated with GERD however since symptoms are new over the last year and she has atypical chest pain w/a negative cardiac work-up will complete EGD. She can use famotidine PRN.        NICOLASA Mendez-CNP 01/29/25 1:44 PM

## 2025-01-29 NOTE — PATIENT INSTRUCTIONS
It was nice to meet you    Continue high fiber diet and water for constipation    Continue famotidine as needed       GERD - Avoid overeating at mealtime.  Stay upright for 2 hours after eating.  Do not eat for 3-4 hours before going to bed.  Elevated the head of the bed 6 inches when you are sleeping. Avoid excessive amounts of fried foods, acidic foods, spicy foods, alcohol, caffeine, peppermint, spearmint, chocolate, non-steroidal anti- inflammatory medications, such as, Ibuprofen, Advil, Motrin, Aleve, Naproxen, Naprosyn, Moloxicam, Etodolac, and Voltaren.

## 2025-02-04 ENCOUNTER — TELEPHONE (OUTPATIENT)
Dept: GASTROENTEROLOGY | Facility: CLINIC | Age: 70
End: 2025-02-04
Payer: MEDICARE

## 2025-02-04 NOTE — TELEPHONE ENCOUNTER
"Returned   Has been worsening abdominal pain  Happens after eating and then resolves a few hours later  \"Feels like a knot, sensitive\"  This is in the epigastric area    She is having a BM daily-has chronic constipation     Denies bleeding, dark tarry stools, vomiting  Denies fevers, night sweats     She tried famotidine last night   It did not help   She will restart Nexium     Reviewed s/sx to go to the ER       "

## 2025-02-12 ENCOUNTER — ANESTHESIA (OUTPATIENT)
Dept: GASTROENTEROLOGY | Facility: HOSPITAL | Age: 70
End: 2025-02-12
Payer: MEDICARE

## 2025-02-12 ENCOUNTER — HOSPITAL ENCOUNTER (OUTPATIENT)
Dept: GASTROENTEROLOGY | Facility: HOSPITAL | Age: 70
Discharge: HOME | End: 2025-02-12
Payer: MEDICARE

## 2025-02-12 ENCOUNTER — ANESTHESIA EVENT (OUTPATIENT)
Dept: GASTROENTEROLOGY | Facility: HOSPITAL | Age: 70
End: 2025-02-12
Payer: MEDICARE

## 2025-02-12 VITALS
OXYGEN SATURATION: 98 % | SYSTOLIC BLOOD PRESSURE: 167 MMHG | BODY MASS INDEX: 24.8 KG/M2 | WEIGHT: 140 LBS | TEMPERATURE: 96.8 F | RESPIRATION RATE: 16 BRPM | HEART RATE: 64 BPM | HEIGHT: 63 IN | DIASTOLIC BLOOD PRESSURE: 68 MMHG

## 2025-02-12 DIAGNOSIS — R05.3 CHRONIC COUGH: ICD-10-CM

## 2025-02-12 DIAGNOSIS — R07.89 ATYPICAL CHEST PAIN: ICD-10-CM

## 2025-02-12 DIAGNOSIS — K21.9 GASTROESOPHAGEAL REFLUX DISEASE WITHOUT ESOPHAGITIS: Primary | ICD-10-CM

## 2025-02-12 PROCEDURE — 3700000002 HC GENERAL ANESTHESIA TIME - EACH INCREMENTAL 1 MINUTE

## 2025-02-12 PROCEDURE — A43239 PR EDG TRANSORAL BIOPSY SINGLE/MULTIPLE: Performed by: ANESTHESIOLOGY

## 2025-02-12 PROCEDURE — 3700000001 HC GENERAL ANESTHESIA TIME - INITIAL BASE CHARGE

## 2025-02-12 PROCEDURE — 99100 ANES PT EXTEME AGE<1 YR&>70: CPT | Performed by: ANESTHESIOLOGY

## 2025-02-12 PROCEDURE — 43239 EGD BIOPSY SINGLE/MULTIPLE: CPT | Performed by: INTERNAL MEDICINE

## 2025-02-12 PROCEDURE — 2500000004 HC RX 250 GENERAL PHARMACY W/ HCPCS (ALT 636 FOR OP/ED): Performed by: ANESTHESIOLOGIST ASSISTANT

## 2025-02-12 PROCEDURE — 7100000009 HC PHASE TWO TIME - INITIAL BASE CHARGE

## 2025-02-12 PROCEDURE — 7100000010 HC PHASE TWO TIME - EACH INCREMENTAL 1 MINUTE

## 2025-02-12 PROCEDURE — A43239 PR EDG TRANSORAL BIOPSY SINGLE/MULTIPLE: Performed by: ANESTHESIOLOGIST ASSISTANT

## 2025-02-12 RX ORDER — LIDOCAINE HYDROCHLORIDE 20 MG/ML
INJECTION, SOLUTION EPIDURAL; INFILTRATION; INTRACAUDAL; PERINEURAL AS NEEDED
Status: DISCONTINUED | OUTPATIENT
Start: 2025-02-12 | End: 2025-02-12

## 2025-02-12 RX ORDER — PROPOFOL 10 MG/ML
INJECTION, EMULSION INTRAVENOUS AS NEEDED
Status: DISCONTINUED | OUTPATIENT
Start: 2025-02-12 | End: 2025-02-12

## 2025-02-12 RX ADMIN — PROPOFOL 50 MG: 10 INJECTION, EMULSION INTRAVENOUS at 13:44

## 2025-02-12 RX ADMIN — SODIUM CHLORIDE, SODIUM LACTATE, POTASSIUM CHLORIDE, AND CALCIUM CHLORIDE: .6; .31; .03; .02 INJECTION, SOLUTION INTRAVENOUS at 13:42

## 2025-02-12 RX ADMIN — LIDOCAINE HYDROCHLORIDE 80 MG: 20 INJECTION, SOLUTION EPIDURAL; INFILTRATION; INTRACAUDAL; PERINEURAL at 13:44

## 2025-02-12 RX ADMIN — PROPOFOL 125 MCG/KG/MIN: 10 INJECTION, EMULSION INTRAVENOUS at 13:45

## 2025-02-12 SDOH — HEALTH STABILITY: MENTAL HEALTH: CURRENT SMOKER: 0

## 2025-02-12 ASSESSMENT — PAIN - FUNCTIONAL ASSESSMENT
PAIN_FUNCTIONAL_ASSESSMENT: 0-10

## 2025-02-12 ASSESSMENT — PAIN SCALES - GENERAL
PAINLEVEL_OUTOF10: 0 - NO PAIN
PAIN_LEVEL: 1

## 2025-02-12 NOTE — ANESTHESIA POSTPROCEDURE EVALUATION
"Patient: Jordana Haney \"Serina\"    Procedure Summary       Date: 02/12/25 Room / Location: [facility]    Anesthesia Start: 1340 Anesthesia Stop: 1357    Procedure: EGD Diagnosis:       Gastroesophageal reflux disease without esophagitis      Atypical chest pain      Chronic cough    Scheduled Providers: Lauren Moreau MD Responsible Provider: Angie Acharya MD    Anesthesia Type: general, MAC ASA Status: 3            Anesthesia Type: general, MAC    Vitals Value Taken Time   /78 02/12/25 1355   Temp 36 °C (96.8 °F) 02/12/25 1355   Pulse 58 02/12/25 1355   Resp 16 02/12/25 1355   SpO2 98 % 02/12/25 1355       Anesthesia Post Evaluation    Patient location during evaluation: PACU  Patient participation: complete - patient participated  Level of consciousness: sleepy but conscious, responsive to verbal stimuli, responsive to light touch and responsive to physical stimuli  Pain score: 1  Pain management: satisfactory to patient  Airway patency: patent  Two or more strategies used to mitigate risk of obstructive sleep apnea  Cardiovascular status: acceptable, hemodynamically stable and stable  Respiratory status: acceptable, unassisted, spontaneous ventilation, nonlabored ventilation and nasal cannula  Hydration status: acceptable  Postoperative Nausea and Vomiting: none        No notable events documented.    "

## 2025-02-12 NOTE — ANESTHESIA PREPROCEDURE EVALUATION
"Patient: Jordana Haney \"Serina\"    Procedure Information       Anesthesia Start Date/Time: 02/12/25 1340    Scheduled providers: Lauren Moreau MD    Procedure: EGD    Location: [facility]            Relevant Problems   Cardiac   (+) Atypical chest pain   (+) Chest pain   (+) Hypertension   (+) LBBB (left bundle branch block)   (+) Left bundle branch block (LBBB)      Neuro   (+) Anxiety about health      Endocrine   (+) Hypothyroidism      Hematology   (+) Thalassemia      Musculoskeletal   (+) Degeneration of intervertebral disc of lumbar region   (+) Fibromyalgia      HEENT   (+) Glaucoma       Clinical information reviewed:   Tobacco  Allergies  Meds  Problems  Med Hx  Surg Hx   Fam Hx          NPO Detail:  No data recorded     Physical Exam    Airway  Mallampati: II  TM distance: >3 FB  Neck ROM: full     Cardiovascular - normal exam  Rhythm: regular  Rate: normal     Dental - normal exam     Pulmonary - normal exam  Breath sounds clear to auscultation     Abdominal   (+) obese  Abdomen: soft  Bowel sounds: normal           Anesthesia Plan    History of general anesthesia?: yes  History of complications of general anesthesia?: no    ASA 3     general and MAC   (MAC or TIVA)  The patient is not a current smoker.  Patient was previously instructed to abstain from smoking on day of procedure.  Patient did not smoke on day of procedure.  Education provided regarding risk of obstructive sleep apnea.  intravenous induction   Anesthetic plan and risks discussed with patient.  Use of blood products discussed with patient who consented to blood products.    Plan discussed with [facility].      "

## 2025-02-12 NOTE — DISCHARGE INSTRUCTIONS

## 2025-02-13 DIAGNOSIS — R10.33 PERIUMBILICAL ABDOMINAL PAIN: Primary | ICD-10-CM

## 2025-02-14 DIAGNOSIS — R10.31 RIGHT LOWER QUADRANT ABDOMINAL PAIN: Primary | ICD-10-CM

## 2025-02-15 LAB
ALBUMIN SERPL-MCNC: 4.6 G/DL (ref 3.6–5.1)
ALP SERPL-CCNC: 58 U/L (ref 37–153)
ALT SERPL-CCNC: 16 U/L (ref 6–29)
ANION GAP SERPL CALCULATED.4IONS-SCNC: 9 MMOL/L (CALC) (ref 7–17)
AST SERPL-CCNC: 14 U/L (ref 10–35)
BILIRUB SERPL-MCNC: 0.5 MG/DL (ref 0.2–1.2)
BUN SERPL-MCNC: 21 MG/DL (ref 7–25)
CALCIUM SERPL-MCNC: 10 MG/DL (ref 8.6–10.4)
CHLORIDE SERPL-SCNC: 105 MMOL/L (ref 98–110)
CO2 SERPL-SCNC: 28 MMOL/L (ref 20–32)
CREAT SERPL-MCNC: 0.92 MG/DL (ref 0.6–1)
CRP SERPL-MCNC: NORMAL MG/L
EGFRCR SERPLBLD CKD-EPI 2021: 67 ML/MIN/1.73M2
ERYTHROCYTE [SEDIMENTATION RATE] IN BLOOD BY WESTERGREN METHOD: 2 MM/H
GLUCOSE SERPL-MCNC: 102 MG/DL (ref 65–99)
LIPASE SERPL-CCNC: 38 U/L (ref 7–60)
POTASSIUM SERPL-SCNC: 4.6 MMOL/L (ref 3.5–5.3)
PROT SERPL-MCNC: 6.9 G/DL (ref 6.1–8.1)
SODIUM SERPL-SCNC: 142 MMOL/L (ref 135–146)

## 2025-02-17 LAB
ALBUMIN SERPL-MCNC: 4.6 G/DL (ref 3.6–5.1)
ALP SERPL-CCNC: 58 U/L (ref 37–153)
ALT SERPL-CCNC: 16 U/L (ref 6–29)
ANION GAP SERPL CALCULATED.4IONS-SCNC: 9 MMOL/L (CALC) (ref 7–17)
AST SERPL-CCNC: 14 U/L (ref 10–35)
BILIRUB SERPL-MCNC: 0.5 MG/DL (ref 0.2–1.2)
BUN SERPL-MCNC: 21 MG/DL (ref 7–25)
CALCIUM SERPL-MCNC: 10 MG/DL (ref 8.6–10.4)
CHLORIDE SERPL-SCNC: 105 MMOL/L (ref 98–110)
CO2 SERPL-SCNC: 28 MMOL/L (ref 20–32)
CREAT SERPL-MCNC: 0.92 MG/DL (ref 0.6–1)
CRP SERPL-MCNC: <3 MG/L
EGFRCR SERPLBLD CKD-EPI 2021: 67 ML/MIN/1.73M2
ERYTHROCYTE [SEDIMENTATION RATE] IN BLOOD BY WESTERGREN METHOD: 2 MM/H
GLUCOSE SERPL-MCNC: 102 MG/DL (ref 65–99)
LIPASE SERPL-CCNC: 38 U/L (ref 7–60)
POTASSIUM SERPL-SCNC: 4.6 MMOL/L (ref 3.5–5.3)
PROT SERPL-MCNC: 6.9 G/DL (ref 6.1–8.1)
SODIUM SERPL-SCNC: 142 MMOL/L (ref 135–146)

## 2025-02-18 LAB
LABORATORY COMMENT REPORT: NORMAL
PATH REPORT.FINAL DX SPEC: NORMAL
PATH REPORT.GROSS SPEC: NORMAL
PATH REPORT.RELEVANT HX SPEC: NORMAL
PATH REPORT.TOTAL CANCER: NORMAL

## 2025-02-20 ENCOUNTER — ANCILLARY PROCEDURE (OUTPATIENT)
Facility: HOSPITAL | Age: 70
End: 2025-02-20
Payer: MEDICARE

## 2025-02-20 DIAGNOSIS — R10.33 PERIUMBILICAL ABDOMINAL PAIN: ICD-10-CM

## 2025-02-20 PROCEDURE — 74177 CT ABD & PELVIS W/CONTRAST: CPT | Performed by: RADIOLOGY

## 2025-02-20 PROCEDURE — 74177 CT ABD & PELVIS W/CONTRAST: CPT

## 2025-02-20 PROCEDURE — 2550000001 HC RX 255 CONTRASTS: Performed by: INTERNAL MEDICINE

## 2025-02-20 RX ADMIN — IOHEXOL 75 ML: 350 INJECTION, SOLUTION INTRAVENOUS at 10:56

## 2025-02-21 ENCOUNTER — APPOINTMENT (OUTPATIENT)
Dept: GASTROENTEROLOGY | Facility: HOSPITAL | Age: 70
End: 2025-02-21
Payer: MEDICARE

## 2025-02-21 ENCOUNTER — TELEPHONE (OUTPATIENT)
Dept: PRIMARY CARE | Facility: CLINIC | Age: 70
End: 2025-02-21
Payer: MEDICARE

## 2025-02-21 NOTE — TELEPHONE ENCOUNTER
Pt would like to know the results of her CT scan today before the weekend. Pt advised she saw the results however needs an interpretation of them from the doctor. Please advise

## 2025-02-24 ENCOUNTER — APPOINTMENT (OUTPATIENT)
Facility: HOSPITAL | Age: 70
End: 2025-02-24
Payer: MEDICARE

## 2025-02-26 ENCOUNTER — OFFICE VISIT (OUTPATIENT)
Dept: GASTROENTEROLOGY | Facility: CLINIC | Age: 70
End: 2025-02-26
Payer: MEDICARE

## 2025-02-26 VITALS
TEMPERATURE: 97.7 F | BODY MASS INDEX: 24.7 KG/M2 | HEART RATE: 70 BPM | HEIGHT: 63 IN | SYSTOLIC BLOOD PRESSURE: 162 MMHG | WEIGHT: 139.4 LBS | DIASTOLIC BLOOD PRESSURE: 87 MMHG

## 2025-02-26 DIAGNOSIS — R10.33 PERIUMBILICAL ABDOMINAL PAIN: ICD-10-CM

## 2025-02-26 DIAGNOSIS — K21.9 GASTROESOPHAGEAL REFLUX DISEASE WITHOUT ESOPHAGITIS: Primary | ICD-10-CM

## 2025-02-26 DIAGNOSIS — K58.2 IRRITABLE BOWEL SYNDROME WITH BOTH CONSTIPATION AND DIARRHEA: ICD-10-CM

## 2025-02-26 DIAGNOSIS — R19.7 DIARRHEA, UNSPECIFIED TYPE: ICD-10-CM

## 2025-02-26 PROCEDURE — 1159F MED LIST DOCD IN RCRD: CPT | Performed by: NURSE PRACTITIONER

## 2025-02-26 PROCEDURE — 99213 OFFICE O/P EST LOW 20 MIN: CPT | Performed by: NURSE PRACTITIONER

## 2025-02-26 PROCEDURE — 3077F SYST BP >= 140 MM HG: CPT | Performed by: NURSE PRACTITIONER

## 2025-02-26 PROCEDURE — 3008F BODY MASS INDEX DOCD: CPT | Performed by: NURSE PRACTITIONER

## 2025-02-26 PROCEDURE — 1125F AMNT PAIN NOTED PAIN PRSNT: CPT | Performed by: NURSE PRACTITIONER

## 2025-02-26 PROCEDURE — 3079F DIAST BP 80-89 MM HG: CPT | Performed by: NURSE PRACTITIONER

## 2025-02-26 PROCEDURE — 1123F ACP DISCUSS/DSCN MKR DOCD: CPT | Performed by: NURSE PRACTITIONER

## 2025-02-26 RX ORDER — WHEAT DEXTRIN 5 G/7.4 G
POWDER (GRAM) ORAL
Qty: 248 G | Refills: 1 | Status: SHIPPED | OUTPATIENT
Start: 2025-02-26

## 2025-02-26 RX ORDER — PEPPERMINT OIL 90 MG
1 CAPSULE, DELAYED, AND EXTENDED RELEASE ORAL 3 TIMES DAILY PRN
Qty: 30 CAPSULE | Refills: 3 | Status: SHIPPED | OUTPATIENT
Start: 2025-02-26

## 2025-02-26 ASSESSMENT — ENCOUNTER SYMPTOMS
APNEA: 0
RESPIRATORY NEGATIVE: 1
DIFFICULTY URINATING: 0
NEUROLOGICAL NEGATIVE: 1
FATIGUE: 0
EYES NEGATIVE: 1
HEMATOLOGIC/LYMPHATIC NEGATIVE: 1
PSYCHIATRIC NEGATIVE: 1
ENDOCRINE NEGATIVE: 1
DIAPHORESIS: 0
CHEST TIGHTNESS: 0
STRIDOR: 0
ROS GI COMMENTS: SEE HPI
SHORTNESS OF BREATH: 0
CARDIOVASCULAR NEGATIVE: 1
ALLERGIC/IMMUNOLOGIC NEGATIVE: 1
WHEEZING: 0
COUGH: 0
CHILLS: 0
MUSCULOSKELETAL NEGATIVE: 1
FEVER: 0

## 2025-02-26 ASSESSMENT — PAIN SCALES - GENERAL: PAINLEVEL_OUTOF10: 2

## 2025-02-26 NOTE — PROGRESS NOTES
"Subjective   Patient ID: Jordana Haney \"Alexsander" is a 69 y.o. female who presents for No chief complaint on file.. PMH: hypothyroidism, HTN, HLD   Last seen in GI on 2025  Denies sleep apnea, diabetes, pacer/defib, blood thinners       EGD (2025)  Mild edematous, erythematous mucosa in the body of the stomach and antrum,   consistent with gastritis; performed cold forceps biopsy to rule out H.   pylori   Biopsies were negative for H pylori     She presents today for follow-up. She has been feeling well. After she had an appointment with me she developed kidney pain, the CT scan was normal and without bowel obstruction or colonic diverticulitis, but showed diverticulosis    Ongoing now for 3+ weeks now, has lower abdominal cramping and bloating. She is having a BM (always constipation) however was having a BM daily. She reports cramping/bloating not alleviated. She reports everything is resolved now.  She thinks a lot of this was stressed.     She denies coughing, choking on food, dysphagia, dark tarry stools, abdominal pain, vomiting    She has chronic constipation. This is her normal. She has started metamucil and this helps.     Colonoscopy in  found one polyp in the transverse colon    Family Hx: She denies a family hx of CRC, GI cancers  P. Grandmother  of ?stomach cancers     Review of Systems   Constitutional:  Negative for chills, diaphoresis, fatigue and fever.   HENT: Negative.     Eyes: Negative.    Respiratory: Negative.  Negative for apnea, cough, chest tightness, shortness of breath, wheezing and stridor.    Cardiovascular: Negative.    Gastrointestinal:         See HPI    Endocrine: Negative.    Genitourinary: Negative.  Negative for difficulty urinating.   Musculoskeletal: Negative.    Skin: Negative.    Allergic/Immunologic: Negative.    Neurological: Negative.    Hematological: Negative.    Psychiatric/Behavioral: Negative.         Objective   Physical Exam  Eyes:      General: Lids are " normal.   Musculoskeletal:         General: Normal range of motion.   Skin:     General: Skin is warm and dry.   Neurological:      Mental Status: She is alert and oriented to person, place, and time.   Psychiatric:         Mood and Affect: Mood normal.         Assessment/Plan   Diagnoses and all orders for this visit:  Atypical chest pain  Gastroesophageal reflux disease without esophagitis  Chronic cough      68 yo female with a h/o hypothyroidism, HTN, HLD presents today for a 1 year h/o throat spasms, coughing, facial flushing and this has improved. She is using famotidine as needed. EGD c/w gastritis.     Today she presents for lower abdominal pain and bloating. This started after last visit with intermittent diarrhea and ongoing for 3+ weeks. She reports symptoms have improved.     Discussed symptoms seem consistent with IBS given worse with anxiety/stress and recent normal colonoscopy, however will complete stool tests, UA if symptoms persist.         NICOLASA Mendez-CNP 02/26/25 9:22 AM

## 2025-02-26 NOTE — PATIENT INSTRUCTIONS
Start benefiber one teaspoon daily with a full glass of water    Start Ibgard for IBS symptoms     If symptoms persist, check stool tests and blood work and urine sample

## 2025-03-01 LAB
APPEARANCE UR: CLEAR
BACTERIA #/AREA URNS HPF: NORMAL /HPF
BACTERIA UR CULT: NORMAL
BILIRUB UR QL STRIP: NEGATIVE
COLOR UR: YELLOW
CRP SERPL-MCNC: NORMAL MG/L
GLUCOSE UR QL STRIP: NEGATIVE
HGB UR QL STRIP: NEGATIVE
HYALINE CASTS #/AREA URNS LPF: NORMAL /LPF
KETONES UR QL STRIP: NEGATIVE
LEUKOCYTE ESTERASE UR QL STRIP: NEGATIVE
NITRITE UR QL STRIP: NEGATIVE
PH UR STRIP: 7 [PH] (ref 5–8)
PROT UR QL STRIP: NEGATIVE
RBC #/AREA URNS HPF: NORMAL /HPF
SERVICE CMNT-IMP: NORMAL
SP GR UR STRIP: 1.01 (ref 1–1.03)
SQUAMOUS #/AREA URNS HPF: NORMAL /HPF
WBC #/AREA URNS HPF: NORMAL /HPF

## 2025-03-03 LAB
APPEARANCE UR: CLEAR
BACTERIA #/AREA URNS HPF: NORMAL /HPF
BACTERIA UR CULT: NORMAL
BILIRUB UR QL STRIP: NEGATIVE
COLOR UR: YELLOW
CRP SERPL-MCNC: <3 MG/L
GLUCOSE UR QL STRIP: NEGATIVE
HGB UR QL STRIP: NEGATIVE
HYALINE CASTS #/AREA URNS LPF: NORMAL /LPF
KETONES UR QL STRIP: NEGATIVE
LEUKOCYTE ESTERASE UR QL STRIP: NEGATIVE
NITRITE UR QL STRIP: NEGATIVE
PH UR STRIP: 7 [PH] (ref 5–8)
PROT UR QL STRIP: NEGATIVE
RBC #/AREA URNS HPF: NORMAL /HPF
SERVICE CMNT-IMP: NORMAL
SP GR UR STRIP: 1.01 (ref 1–1.03)
SQUAMOUS #/AREA URNS HPF: NORMAL /HPF
WBC #/AREA URNS HPF: NORMAL /HPF

## 2025-03-07 ENCOUNTER — APPOINTMENT (OUTPATIENT)
Dept: PRIMARY CARE | Facility: CLINIC | Age: 70
End: 2025-03-07
Payer: MEDICARE

## 2025-03-07 VITALS
HEART RATE: 67 BPM | SYSTOLIC BLOOD PRESSURE: 128 MMHG | TEMPERATURE: 98.2 F | HEIGHT: 63 IN | BODY MASS INDEX: 24.1 KG/M2 | RESPIRATION RATE: 18 BRPM | OXYGEN SATURATION: 98 % | DIASTOLIC BLOOD PRESSURE: 78 MMHG | WEIGHT: 136 LBS

## 2025-03-07 DIAGNOSIS — R35.0 FREQUENCY OF URINATION: ICD-10-CM

## 2025-03-07 DIAGNOSIS — Z12.31 ENCOUNTER FOR SCREENING MAMMOGRAM FOR MALIGNANT NEOPLASM OF BREAST: ICD-10-CM

## 2025-03-07 DIAGNOSIS — Z78.0 POST-MENOPAUSAL: ICD-10-CM

## 2025-03-07 DIAGNOSIS — G47.00 INSOMNIA, UNSPECIFIED TYPE: ICD-10-CM

## 2025-03-07 DIAGNOSIS — Z00.00 MEDICARE ANNUAL WELLNESS VISIT, SUBSEQUENT: ICD-10-CM

## 2025-03-07 DIAGNOSIS — K21.9 GASTROESOPHAGEAL REFLUX DISEASE, UNSPECIFIED WHETHER ESOPHAGITIS PRESENT: ICD-10-CM

## 2025-03-07 DIAGNOSIS — I10 HYPERTENSION, UNSPECIFIED TYPE: ICD-10-CM

## 2025-03-07 DIAGNOSIS — Z00.00 HEALTH CARE MAINTENANCE: Primary | ICD-10-CM

## 2025-03-07 PROCEDURE — 93000 ELECTROCARDIOGRAM COMPLETE: CPT | Performed by: INTERNAL MEDICINE

## 2025-03-07 PROCEDURE — 1123F ACP DISCUSS/DSCN MKR DOCD: CPT | Performed by: INTERNAL MEDICINE

## 2025-03-07 PROCEDURE — 3078F DIAST BP <80 MM HG: CPT | Performed by: INTERNAL MEDICINE

## 2025-03-07 PROCEDURE — G0444 DEPRESSION SCREEN ANNUAL: HCPCS | Performed by: INTERNAL MEDICINE

## 2025-03-07 PROCEDURE — 1159F MED LIST DOCD IN RCRD: CPT | Performed by: INTERNAL MEDICINE

## 2025-03-07 PROCEDURE — 1158F ADVNC CARE PLAN TLK DOCD: CPT | Performed by: INTERNAL MEDICINE

## 2025-03-07 PROCEDURE — 3008F BODY MASS INDEX DOCD: CPT | Performed by: INTERNAL MEDICINE

## 2025-03-07 PROCEDURE — 99397 PER PM REEVAL EST PAT 65+ YR: CPT | Performed by: INTERNAL MEDICINE

## 2025-03-07 PROCEDURE — 3074F SYST BP LT 130 MM HG: CPT | Performed by: INTERNAL MEDICINE

## 2025-03-07 PROCEDURE — 99214 OFFICE O/P EST MOD 30 MIN: CPT | Performed by: INTERNAL MEDICINE

## 2025-03-07 PROCEDURE — G0439 PPPS, SUBSEQ VISIT: HCPCS | Performed by: INTERNAL MEDICINE

## 2025-03-07 PROCEDURE — 1170F FXNL STATUS ASSESSED: CPT | Performed by: INTERNAL MEDICINE

## 2025-03-07 RX ORDER — AMLODIPINE BESYLATE 5 MG/1
5 TABLET ORAL DAILY
Qty: 90 TABLET | Refills: 0 | Status: SHIPPED | OUTPATIENT
Start: 2025-03-07

## 2025-03-07 RX ORDER — TRAZODONE HYDROCHLORIDE 50 MG/1
50 TABLET ORAL NIGHTLY
Qty: 30 TABLET | Refills: 3 | Status: SHIPPED | OUTPATIENT
Start: 2025-03-07 | End: 2026-03-07

## 2025-03-07 ASSESSMENT — ACTIVITIES OF DAILY LIVING (ADL)
DOING_HOUSEWORK: INDEPENDENT
GROCERY_SHOPPING: INDEPENDENT
BATHING: INDEPENDENT
DRESSING: INDEPENDENT
MANAGING_FINANCES: INDEPENDENT
TAKING_MEDICATION: INDEPENDENT

## 2025-03-07 NOTE — PROGRESS NOTES
Annual Medicare Wellness Exam/Comprehensive Problem Focused Follow Up  HPI/CC  Chief Complaint   Patient presents with    Annual Exam    Medicare Annual Wellness Visit Subsequent     Reviewed chart for care received since last appointment.    69-year-old female patient presented to the office today for her annual exam and Medicare wellness visit.  Patient is known to have history of hypertension as well as acid reflux and hypothyroidism and hyperlipidemia.  She is doing great denying chest pain and shortness of breath not even on exertion she denies abdominal pain nausea vomiting changes in the bowel movements or blood in the stool she denies urine symptoms her appetite is good her energy level is adequate.  Assessment and Plan:  Problem List Items Addressed This Visit    None  Visit Diagnoses       Medicare annual wellness visit, subsequent    -  Primary    Relevant Orders    ECG 12 lead (Clinic Performed)    Health care maintenance        Relevant Orders    ECG 12 lead (Clinic Performed)        69-year-old patient with the following issues.    1.  Hypertension currently on amlodipine patient's blood pressure is adequate continue the current medication no changes.    2.  Hyperlipidemia on Crestor lipid profile is pending we will discuss the results with her once available.    3.  Acid reflux on PPI last EGD indicated the presence of gastritis we did discuss that needs to be taken first thing in the morning on empty stomach patient stated understanding.  She also uses famotidine on a as needed basis    4.  Insomnia on trazodone the plan is to continue no changes.    5.  Healthcare maintenance issues lab work is requested mammogram is requested DEXA scan is requested we will discuss the results with the patient once available.    Medicare wellness was completed with its requirements.    Patient needs to know if she needs a booster for MMR and titers were requested we will discuss results with her once we have them  "available.    Status post recent evaluation with gastroenterology she is doing very well from that standpoint her abdominal bloating and distention have resolved    ROS otherwise negative aside from what was mentioned above in HPI.    Vitals  /78   Pulse 67   Temp 36.8 °C (98.2 °F)   Resp 18   Ht 1.6 m (5' 3\")   Wt 61.7 kg (136 lb)   SpO2 98%   BMI 24.09 kg/m²   Body mass index is 24.09 kg/m².  Physical Exam  Gen: Alert, NAD  HEENT:  Unremarkable  Neck:  No DAMON  Respiratory:  Lungs CTAB  Cardiovascular:  Heart RRR  Neuro:  Gross motor and sensory intact  Skin:  No suspicious lesions present  Breast: No masses, or axillary lymphadenopathy      Patient Care Team:  Patricia Crawley MD as PCP - General (Internal Medicine)  Patricia Crawley MD as PCP - [employer] PCP  Nighat Barbour MD as Referring Physician (Dermatology)       Health Risk Assessment:  Patient was asked if he/she has any difficulty performing the following activities of daily living:  Preparing nutritious food and eating? No  Grocery shopping? No  Bathing and grooming yourself? No  Getting dressed? No  Using the toilet?No  Using the phone? No  Moving around from place to place (physical ambulation)? No  Doing housework (including laundry) independently? No  Managing finances independently? No  Managing medications independently? No  Doing housework (including laundry) independently? No    Patient was asked if he/she:  Feels safe in current home environment?: Yes  Uses seatbelt? Yes  Sees the dentist regularly? Yes  Exercises regularly: Yes  Suffers from depression, stress, anger, loneliness or social isolation, pain, suicidality? No    Dietary issues discussed: Yes  Cognitive Impairment No  Hearing difficulties: No  Visual Acuity assessed: Yes    What is your self-assessment of overall health status and life satisfaction? Good     5 minutes spent on SDOH screening:   Specifically Housing, Food Insecurity, Utilities and " Transportatin Needs were evaluated   (See Screenings in Rooming section for documentation)  Food Insecurity: Not on file     Housing Stability: Not on file     Transportation Needs: Not on file       Allergies and Medications  Povidone-iodine  Current Outpatient Medications   Medication Instructions    amLODIPine (NORVASC) 5 mg, oral, Daily    esomeprazole (NEXIUM) 40 mg, oral, Daily before breakfast, Do not open capsule.    famotidine (PEPCID) 40 mg, oral, Daily    levothyroxine (SYNTHROID, LEVOXYL) 50 mcg, oral, Daily before breakfast    multivit-min/ferrous fumarate (MULTI VITAMIN ORAL) On occasion    peppermint oiL (IBgard) 90 mg capsule,delayed,extend.release 1 capsule, oral, 3 times daily PRN    rosuvastatin (CRESTOR) 10 mg, oral, Daily    timolol (Timoptic) 0.5 % ophthalmic solution INSTILL ONE DROP INTO BOTH EYES IN THE MORNING    traZODone (DESYREL) 50 mg, oral, Nightly    wheat dextrin (Benefiber Healthy Shape) 5 gram/7.4 gram powder Take 1 teaspoon daily       Medications and Supplements  prescribed by me and other practitioners or clinical pharmacist (such as prescriptions, OTC's, herbal therapies and supplements) were reviewed and documented in the medical record.      Active Problem List  Patient Active Problem List   Diagnosis    Anxiety about health    Atypical chest pain    Back pain    Cramps of lower extremity    Degeneration of intervertebral disc of lumbar region    Dyslipidemia    Fibromyositis    Glaucoma    Hypothyroidism    Left bundle branch block (LBBB)    Menopausal syndrome    Thalassemia    Fibromyalgia    Palpitations    Vitamin D deficiency    Acute left-sided low back pain with left-sided sciatica    Chest pain    Nocturnal leg cramps    Cyst of thyroid    LBBB (left bundle branch block)    Female climacteric    Hypertension       Comprehensive Medical/Surgical/Social/Family History  Past Medical History:   Diagnosis Date    Elevated cholesterol     Glaucoma     Hypertension      Hypothyroidism      Past Surgical History:   Procedure Laterality Date    BREAST BIOPSY Right      SECTION, CLASSIC      THYROID SURGERY      nodule removed     Social History     Social History Narrative    Not on file         Tobacco/Alcohol/Opioid use, as well as Illicit Drug Use was screened for/reviewed and documented in Social Documentation section of the chart and medication list as appropriate     Depression Screening  Depression screening completed using the PHQ-2 questions, with results documented in the chart/encounter (5 min spent on this).  Patient Health Questionnaire-2 Score: 0 (3/7/2025 12:59 PM)  (See also Rooming/Screening section for documentation, and/or progress note for additional information)    Cardiac Risk Assessment (15 minutes spent on this)  The 10-year ASCVD risk score (Juan F PAZ, et al., 2019) is: 13%    Values used to calculate the score:      Age: 69 years      Sex: Female      Is Non- : No      Diabetic: No      Tobacco smoker: No      Systolic Blood Pressure: 128 mmHg      Is BP treated: Yes      HDL Cholesterol: 68 mg/dL      Total Cholesterol: 169 mg/dL    Cardiovascular risk was discussed and, if needed, lifestyle modifications recommended, including nutritional choices, exercise, and elimination of habits contributing to risk. We agreed on a plan to reduce the current cardiovascular risk based on above discussion as needed.     Aspirin use/disuse was discussed and documented in the Problem List of the medical record (under Cardiac Risk Counseling) after reviewing the updated guidelines below:  Consider low dose Aspirin ( mg) use if the benefit for cardiovascular disease prevention outweighs risk for bleeding complications.   Discussed that in general, low dose ASA should be considered:  In patients WITHOUT prior MI/stroke/PAD (primary prevention):   a. Age <60: Use if 10-year cardiovascular disease risk >20%, with discussion of risks and  benefits with patient  b. Age 60-<70: Use if 10-year cardiovascular disease risk >20% and low bleeding (e.g., gastrointestinal) risk, with discussion of risks and benefits with patient  c. Age >=70: Do not use    In patients WITH prior MI/stroke/PAD (secondary prevention):   Generally use unless extremely high bleeding (e.g., gastrointenstinal) risk, with discussion of risks and benefits with patient    Advance Directives Discussion  Advanced Care Planning (including a Living Will, Healthcare POA, as well as specific end of life choices and/or directives), was discussed with the patient and/or surrogate, voluntarily, and details of that discussion documented in the Problem List (under Advanced Directives Discussion) of the medical record.   (16 min spent discussing above)     During the course of the visit the patient was educated and counseled about age appropriate screening and preventive services.   Completed preventive screenings were documented in the chart (see Routine Health Maintenance in Problem List) and orders were placed for outstanding screenings/procedures as documented in the Assessment and Plan.    Patient Instructions (the written plan) was given to the patient at check out as part of the AVS.

## 2025-03-14 LAB
ALBUMIN SERPL-MCNC: 4.7 G/DL (ref 3.6–5.1)
ALP SERPL-CCNC: 57 U/L (ref 37–153)
ALT SERPL-CCNC: 11 U/L (ref 6–29)
ANION GAP SERPL CALCULATED.4IONS-SCNC: 6 MMOL/L (CALC) (ref 7–17)
AST SERPL-CCNC: 14 U/L (ref 10–35)
BILIRUB SERPL-MCNC: 0.4 MG/DL (ref 0.2–1.2)
BUN SERPL-MCNC: 16 MG/DL (ref 7–25)
CALCIUM SERPL-MCNC: 9.4 MG/DL (ref 8.6–10.4)
CHLORIDE SERPL-SCNC: 104 MMOL/L (ref 98–110)
CHOLEST SERPL-MCNC: 186 MG/DL
CHOLEST/HDLC SERPL: 3 (CALC)
CO2 SERPL-SCNC: 29 MMOL/L (ref 20–32)
CREAT SERPL-MCNC: 0.77 MG/DL (ref 0.6–1)
EGFRCR SERPLBLD CKD-EPI 2021: 82 ML/MIN/1.73M2
ERYTHROCYTE [DISTWIDTH] IN BLOOD BY AUTOMATED COUNT: 18.7 % (ref 11–15)
GLUCOSE SERPL-MCNC: 90 MG/DL (ref 65–99)
HCT VFR BLD AUTO: 39.9 % (ref 35–45)
HDLC SERPL-MCNC: 63 MG/DL
HGB BLD-MCNC: 12.1 G/DL (ref 11.7–15.5)
LDLC SERPL CALC-MCNC: 103 MG/DL (CALC)
MCH RBC QN AUTO: 19.6 PG (ref 27–33)
MCHC RBC AUTO-ENTMCNC: 30.3 G/DL (ref 32–36)
MCV RBC AUTO: 64.6 FL (ref 80–100)
MEV IGG SER IA-ACNC: >300 AU/ML
MUV IGG SER IA-ACNC: 297 AU/ML
NONHDLC SERPL-MCNC: 123 MG/DL (CALC)
PLATELET # BLD AUTO: 365 THOUSAND/UL (ref 140–400)
PMV BLD REES-ECKER: 10.4 FL (ref 7.5–12.5)
POTASSIUM SERPL-SCNC: 4.8 MMOL/L (ref 3.5–5.3)
PROT SERPL-MCNC: 6.8 G/DL (ref 6.1–8.1)
RBC # BLD AUTO: 6.18 MILLION/UL (ref 3.8–5.1)
RUBV IGG SERPL IA-ACNC: 24.3 INDEX
SODIUM SERPL-SCNC: 139 MMOL/L (ref 135–146)
TRIGL SERPL-MCNC: 104 MG/DL
TSH SERPL-ACNC: 1.31 MIU/L (ref 0.4–4.5)
WBC # BLD AUTO: 5.9 THOUSAND/UL (ref 3.8–10.8)

## 2025-03-20 ENCOUNTER — APPOINTMENT (OUTPATIENT)
Dept: GASTROENTEROLOGY | Facility: CLINIC | Age: 70
End: 2025-03-20
Payer: MEDICARE

## 2025-03-26 ENCOUNTER — APPOINTMENT (OUTPATIENT)
Dept: GASTROENTEROLOGY | Facility: CLINIC | Age: 70
End: 2025-03-26
Payer: MEDICARE

## 2025-04-02 ENCOUNTER — HOSPITAL ENCOUNTER (OUTPATIENT)
Dept: RADIOLOGY | Facility: CLINIC | Age: 70
Discharge: HOME | End: 2025-04-02
Payer: MEDICARE

## 2025-04-02 VITALS — HEIGHT: 63 IN | WEIGHT: 136 LBS | BODY MASS INDEX: 24.1 KG/M2

## 2025-04-02 DIAGNOSIS — Z12.31 ENCOUNTER FOR SCREENING MAMMOGRAM FOR MALIGNANT NEOPLASM OF BREAST: ICD-10-CM

## 2025-04-02 DIAGNOSIS — Z78.0 POST-MENOPAUSAL: ICD-10-CM

## 2025-04-02 PROCEDURE — 77063 BREAST TOMOSYNTHESIS BI: CPT

## 2025-04-02 PROCEDURE — 77080 DXA BONE DENSITY AXIAL: CPT

## 2025-04-02 PROCEDURE — 77067 SCR MAMMO BI INCL CAD: CPT | Performed by: RADIOLOGY

## 2025-04-02 PROCEDURE — 77063 BREAST TOMOSYNTHESIS BI: CPT | Performed by: RADIOLOGY

## 2025-04-02 PROCEDURE — 77080 DXA BONE DENSITY AXIAL: CPT | Performed by: STUDENT IN AN ORGANIZED HEALTH CARE EDUCATION/TRAINING PROGRAM

## 2025-04-14 ENCOUNTER — APPOINTMENT (OUTPATIENT)
Dept: GASTROENTEROLOGY | Facility: HOSPITAL | Age: 70
End: 2025-04-14
Payer: MEDICARE

## 2025-04-14 DIAGNOSIS — K21.9 GASTROESOPHAGEAL REFLUX DISEASE WITHOUT ESOPHAGITIS: Primary | ICD-10-CM

## 2025-06-06 ENCOUNTER — PATIENT MESSAGE (OUTPATIENT)
Dept: PRIMARY CARE | Facility: CLINIC | Age: 70
End: 2025-06-06
Payer: MEDICARE

## 2025-06-06 DIAGNOSIS — I10 HYPERTENSION, UNSPECIFIED TYPE: ICD-10-CM

## 2025-06-09 RX ORDER — AMLODIPINE BESYLATE 5 MG/1
5 TABLET ORAL DAILY
Qty: 90 TABLET | Refills: 0 | Status: SHIPPED | OUTPATIENT
Start: 2025-06-09 | End: 2025-06-09

## 2025-06-10 RX ORDER — AMLODIPINE BESYLATE 5 MG/1
5 TABLET ORAL DAILY
Qty: 90 TABLET | Refills: 0 | Status: SHIPPED | OUTPATIENT
Start: 2025-06-10

## 2025-07-11 ENCOUNTER — TELEPHONE (OUTPATIENT)
Dept: PRIMARY CARE | Facility: CLINIC | Age: 70
End: 2025-07-11
Payer: MEDICARE

## 2025-09-02 DIAGNOSIS — I10 HYPERTENSION, UNSPECIFIED TYPE: ICD-10-CM

## 2025-09-02 RX ORDER — AMLODIPINE BESYLATE 5 MG/1
5 TABLET ORAL DAILY
Qty: 90 TABLET | Refills: 3 | Status: SHIPPED | OUTPATIENT
Start: 2025-09-02

## 2026-03-03 ENCOUNTER — APPOINTMENT (OUTPATIENT)
Dept: PRIMARY CARE | Facility: CLINIC | Age: 71
End: 2026-03-03
Payer: MEDICARE

## 2026-03-06 ENCOUNTER — APPOINTMENT (OUTPATIENT)
Dept: PRIMARY CARE | Facility: CLINIC | Age: 71
End: 2026-03-06
Payer: MEDICARE